# Patient Record
Sex: MALE | Race: WHITE | NOT HISPANIC OR LATINO | Employment: OTHER | URBAN - NONMETROPOLITAN AREA
[De-identification: names, ages, dates, MRNs, and addresses within clinical notes are randomized per-mention and may not be internally consistent; named-entity substitution may affect disease eponyms.]

---

## 2019-10-25 ENCOUNTER — HOSPITAL ENCOUNTER (EMERGENCY)
Facility: HOSPITAL | Age: 64
Discharge: HOME | End: 2019-10-25
Attending: EMERGENCY MEDICINE | Admitting: EMERGENCY MEDICINE

## 2019-10-25 ENCOUNTER — APPOINTMENT (OUTPATIENT)
Dept: ULTRASOUND IMAGING | Facility: HOSPITAL | Age: 64
End: 2019-10-25

## 2019-10-25 VITALS
TEMPERATURE: 97.7 F | OXYGEN SATURATION: 98 % | HEART RATE: 103 BPM | BODY MASS INDEX: 18.06 KG/M2 | HEIGHT: 66 IN | WEIGHT: 112.4 LBS | SYSTOLIC BLOOD PRESSURE: 161 MMHG | DIASTOLIC BLOOD PRESSURE: 106 MMHG | RESPIRATION RATE: 18 BRPM

## 2019-10-25 DIAGNOSIS — S40.021A TRAUMATIC HEMATOMA OF RIGHT UPPER ARM, INITIAL ENCOUNTER: Primary | ICD-10-CM

## 2019-10-25 PROCEDURE — 93971 EXTREMITY STUDY: CPT

## 2019-10-25 PROCEDURE — 99282 EMERGENCY DEPT VISIT SF MDM: CPT

## 2019-12-30 ENCOUNTER — HOSPITAL ENCOUNTER (EMERGENCY)
Facility: HOSPITAL | Age: 64
Discharge: HOME | End: 2019-12-30
Attending: EMERGENCY MEDICINE | Admitting: EMERGENCY MEDICINE

## 2019-12-30 ENCOUNTER — APPOINTMENT (OUTPATIENT)
Dept: GENERAL RADIOLOGY | Facility: HOSPITAL | Age: 64
End: 2019-12-30

## 2019-12-30 ENCOUNTER — APPOINTMENT (OUTPATIENT)
Dept: CT IMAGING | Facility: HOSPITAL | Age: 64
End: 2019-12-30

## 2019-12-30 ENCOUNTER — APPOINTMENT (OUTPATIENT)
Dept: ULTRASOUND IMAGING | Facility: HOSPITAL | Age: 64
End: 2019-12-30

## 2019-12-30 VITALS
SYSTOLIC BLOOD PRESSURE: 119 MMHG | HEIGHT: 66 IN | OXYGEN SATURATION: 94 % | BODY MASS INDEX: 18.61 KG/M2 | TEMPERATURE: 97.6 F | RESPIRATION RATE: 16 BRPM | WEIGHT: 115.8 LBS | DIASTOLIC BLOOD PRESSURE: 83 MMHG | HEART RATE: 64 BPM

## 2019-12-30 DIAGNOSIS — R10.9 ABDOMINAL PAIN, UNSPECIFIED ABDOMINAL LOCATION: ICD-10-CM

## 2019-12-30 DIAGNOSIS — N30.00 ACUTE CYSTITIS WITHOUT HEMATURIA: ICD-10-CM

## 2019-12-30 DIAGNOSIS — K85.20 ALCOHOL-INDUCED ACUTE PANCREATITIS WITHOUT INFECTION OR NECROSIS: Primary | ICD-10-CM

## 2019-12-30 LAB
ALBUMIN SERPL-MCNC: 3.7 G/DL (ref 3.5–5.2)
ALBUMIN/GLOB SERPL: 1.1 G/DL
ALP SERPL-CCNC: 103 U/L (ref 39–117)
ALT SERPL W P-5'-P-CCNC: 42 U/L (ref 1–41)
ANION GAP SERPL CALCULATED.3IONS-SCNC: 14 MMOL/L (ref 5–15)
AST SERPL-CCNC: 52 U/L (ref 1–40)
BACTERIA UR QL AUTO: ABNORMAL /HPF
BASOPHILS # BLD AUTO: 0.07 10*3/MM3 (ref 0–0.2)
BASOPHILS NFR BLD AUTO: 0.7 % (ref 0–1.5)
BILIRUB SERPL-MCNC: 0.8 MG/DL (ref 0.2–1.2)
BILIRUB UR QL STRIP: ABNORMAL
BUN BLD-MCNC: 8 MG/DL (ref 8–23)
BUN/CREAT SERPL: 10.3 (ref 7–25)
CALCIUM SPEC-SCNC: 9.3 MG/DL (ref 8.6–10.5)
CHLORIDE SERPL-SCNC: 100 MMOL/L (ref 98–107)
CLARITY UR: CLEAR
CO2 SERPL-SCNC: 23 MMOL/L (ref 22–29)
COLOR UR: ABNORMAL
CREAT BLD-MCNC: 0.78 MG/DL (ref 0.76–1.27)
DEPRECATED RDW RBC AUTO: 46.5 FL (ref 37–54)
EOSINOPHIL # BLD AUTO: 0.61 10*3/MM3 (ref 0–0.4)
EOSINOPHIL NFR BLD AUTO: 6.3 % (ref 0.3–6.2)
ERYTHROCYTE [DISTWIDTH] IN BLOOD BY AUTOMATED COUNT: 12.3 % (ref 12.3–15.4)
GFR SERPL CREATININE-BSD FRML MDRD: 101 ML/MIN/1.73
GLOBULIN UR ELPH-MCNC: 3.3 GM/DL
GLUCOSE BLD-MCNC: 200 MG/DL (ref 65–99)
GLUCOSE UR STRIP-MCNC: NEGATIVE MG/DL
HCT VFR BLD AUTO: 41.7 % (ref 37.5–51)
HGB BLD-MCNC: 14.1 G/DL (ref 13–17.7)
HGB UR QL STRIP.AUTO: NEGATIVE
HOLD SPECIMEN: NORMAL
HOLD SPECIMEN: NORMAL
HYALINE CASTS UR QL AUTO: ABNORMAL /LPF
IMM GRANULOCYTES # BLD AUTO: 0.02 10*3/MM3 (ref 0–0.05)
IMM GRANULOCYTES NFR BLD AUTO: 0.2 % (ref 0–0.5)
KETONES UR QL STRIP: ABNORMAL
LEUKOCYTE ESTERASE UR QL STRIP.AUTO: ABNORMAL
LIPASE SERPL-CCNC: 372 U/L (ref 13–60)
LYMPHOCYTES # BLD AUTO: 1.64 10*3/MM3 (ref 0.7–3.1)
LYMPHOCYTES NFR BLD AUTO: 17 % (ref 19.6–45.3)
MCH RBC QN AUTO: 34.6 PG (ref 26.6–33)
MCHC RBC AUTO-ENTMCNC: 33.8 G/DL (ref 31.5–35.7)
MCV RBC AUTO: 102.2 FL (ref 79–97)
MONOCYTES # BLD AUTO: 1.15 10*3/MM3 (ref 0.1–0.9)
MONOCYTES NFR BLD AUTO: 11.9 % (ref 5–12)
MUCOUS THREADS URNS QL MICRO: ABNORMAL /HPF
NEUTROPHILS # BLD AUTO: 6.17 10*3/MM3 (ref 1.7–7)
NEUTROPHILS NFR BLD AUTO: 63.9 % (ref 42.7–76)
NITRITE UR QL STRIP: NEGATIVE
NRBC BLD AUTO-RTO: 0 /100 WBC (ref 0–0.2)
PH UR STRIP.AUTO: 6.5 [PH] (ref 5–8)
PLATELET # BLD AUTO: 339 10*3/MM3 (ref 140–450)
PMV BLD AUTO: 8.3 FL (ref 6–12)
POTASSIUM BLD-SCNC: 4.2 MMOL/L (ref 3.5–5.2)
PROT SERPL-MCNC: 7 G/DL (ref 6–8.5)
PROT UR QL STRIP: ABNORMAL
RBC # BLD AUTO: 4.08 10*6/MM3 (ref 4.14–5.8)
RBC # UR: ABNORMAL /HPF
REF LAB TEST METHOD: ABNORMAL
SODIUM BLD-SCNC: 137 MMOL/L (ref 136–145)
SP GR UR STRIP: >=1.03 (ref 1–1.03)
SQUAMOUS #/AREA URNS HPF: ABNORMAL /HPF
TROPONIN T SERPL-MCNC: <0.01 NG/ML (ref 0–0.03)
UROBILINOGEN UR QL STRIP: ABNORMAL
WBC NRBC COR # BLD: 9.66 10*3/MM3 (ref 3.4–10.8)
WBC UR QL AUTO: ABNORMAL /HPF
WHOLE BLOOD HOLD SPECIMEN: NORMAL
WHOLE BLOOD HOLD SPECIMEN: NORMAL

## 2019-12-30 PROCEDURE — 81001 URINALYSIS AUTO W/SCOPE: CPT | Performed by: PHYSICIAN ASSISTANT

## 2019-12-30 PROCEDURE — 83690 ASSAY OF LIPASE: CPT | Performed by: EMERGENCY MEDICINE

## 2019-12-30 PROCEDURE — 99284 EMERGENCY DEPT VISIT MOD MDM: CPT

## 2019-12-30 PROCEDURE — 93005 ELECTROCARDIOGRAM TRACING: CPT | Performed by: EMERGENCY MEDICINE

## 2019-12-30 PROCEDURE — 84484 ASSAY OF TROPONIN QUANT: CPT | Performed by: EMERGENCY MEDICINE

## 2019-12-30 PROCEDURE — 71046 X-RAY EXAM CHEST 2 VIEWS: CPT

## 2019-12-30 PROCEDURE — 25010000002 IOPAMIDOL 61 % SOLUTION: Performed by: EMERGENCY MEDICINE

## 2019-12-30 PROCEDURE — 76705 ECHO EXAM OF ABDOMEN: CPT

## 2019-12-30 PROCEDURE — 80053 COMPREHEN METABOLIC PANEL: CPT | Performed by: EMERGENCY MEDICINE

## 2019-12-30 PROCEDURE — 85025 COMPLETE CBC W/AUTO DIFF WBC: CPT | Performed by: EMERGENCY MEDICINE

## 2019-12-30 PROCEDURE — 96360 HYDRATION IV INFUSION INIT: CPT

## 2019-12-30 PROCEDURE — 74177 CT ABD & PELVIS W/CONTRAST: CPT

## 2019-12-30 RX ORDER — ONDANSETRON 4 MG/1
4 TABLET, FILM COATED ORAL EVERY 6 HOURS PRN
Qty: 20 TABLET | Refills: 0 | Status: ON HOLD | OUTPATIENT
Start: 2019-12-30 | End: 2020-01-10

## 2019-12-30 RX ORDER — OXYCODONE HYDROCHLORIDE AND ACETAMINOPHEN 5; 325 MG/1; MG/1
1 TABLET ORAL EVERY 6 HOURS PRN
Qty: 20 TABLET | Refills: 0 | Status: ON HOLD | OUTPATIENT
Start: 2019-12-30 | End: 2020-01-10

## 2019-12-30 RX ORDER — CEFUROXIME AXETIL 500 MG/1
500 TABLET ORAL 2 TIMES DAILY
Qty: 14 TABLET | Refills: 0 | Status: SHIPPED | OUTPATIENT
Start: 2019-12-30 | End: 2020-01-06

## 2019-12-30 RX ORDER — SODIUM CHLORIDE 0.9 % (FLUSH) 0.9 %
10 SYRINGE (ML) INJECTION AS NEEDED
Status: DISCONTINUED | OUTPATIENT
Start: 2019-12-30 | End: 2019-12-30 | Stop reason: HOSPADM

## 2019-12-30 RX ADMIN — SODIUM CHLORIDE 1000 ML: 9 INJECTION, SOLUTION INTRAVENOUS at 10:20

## 2019-12-30 RX ADMIN — IOPAMIDOL 100 ML: 612 INJECTION, SOLUTION INTRAVENOUS at 10:52

## 2020-01-09 ENCOUNTER — APPOINTMENT (OUTPATIENT)
Dept: CT IMAGING | Facility: HOSPITAL | Age: 65
End: 2020-01-09

## 2020-01-09 ENCOUNTER — HOSPITAL ENCOUNTER (INPATIENT)
Facility: HOSPITAL | Age: 65
LOS: 1 days | Discharge: ANOTHER HOSPITAL | End: 2020-01-10
Attending: EMERGENCY MEDICINE | Admitting: INTERNAL MEDICINE

## 2020-01-09 DIAGNOSIS — K86.1 ACUTE ON CHRONIC PANCREATITIS (HCC): Primary | ICD-10-CM

## 2020-01-09 DIAGNOSIS — K86.3 PSEUDOCYST OF PANCREAS: ICD-10-CM

## 2020-01-09 DIAGNOSIS — K85.90 ACUTE ON CHRONIC PANCREATITIS (HCC): Primary | ICD-10-CM

## 2020-01-09 LAB
ALBUMIN SERPL-MCNC: 2.9 G/DL (ref 3.5–5.2)
ALBUMIN/GLOB SERPL: 0.9 G/DL
ALP SERPL-CCNC: 124 U/L (ref 39–117)
ALT SERPL W P-5'-P-CCNC: 47 U/L (ref 1–41)
AMPHET+METHAMPHET UR QL: NEGATIVE
AMPHETAMINES UR QL: NEGATIVE
ANION GAP SERPL CALCULATED.3IONS-SCNC: 14.7 MMOL/L (ref 5–15)
AST SERPL-CCNC: 33 U/L (ref 1–40)
BACTERIA UR QL AUTO: ABNORMAL /HPF
BARBITURATES UR QL SCN: NEGATIVE
BASOPHILS # BLD AUTO: 0.04 10*3/MM3 (ref 0–0.2)
BASOPHILS NFR BLD AUTO: 0.3 % (ref 0–1.5)
BENZODIAZ UR QL SCN: NEGATIVE
BILIRUB SERPL-MCNC: 1.6 MG/DL (ref 0.2–1.2)
BILIRUB UR QL STRIP: NEGATIVE
BUN BLD-MCNC: 8 MG/DL (ref 8–23)
BUN/CREAT SERPL: 11.4 (ref 7–25)
BUPRENORPHINE SERPL-MCNC: NEGATIVE NG/ML
CALCIUM SPEC-SCNC: 8.5 MG/DL (ref 8.6–10.5)
CANNABINOIDS SERPL QL: NEGATIVE
CHLORIDE SERPL-SCNC: 98 MMOL/L (ref 98–107)
CLARITY UR: CLEAR
CO2 SERPL-SCNC: 24.3 MMOL/L (ref 22–29)
COCAINE UR QL: NEGATIVE
COLOR UR: YELLOW
CREAT BLD-MCNC: 0.7 MG/DL (ref 0.76–1.27)
DEPRECATED RDW RBC AUTO: 49.1 FL (ref 37–54)
EOSINOPHIL # BLD AUTO: 0.14 10*3/MM3 (ref 0–0.4)
EOSINOPHIL NFR BLD AUTO: 1 % (ref 0.3–6.2)
ERYTHROCYTE [DISTWIDTH] IN BLOOD BY AUTOMATED COUNT: 13.2 % (ref 12.3–15.4)
ETHANOL BLD-MCNC: <10 MG/DL (ref 0–10)
ETHANOL UR QL: <0.01 %
GFR SERPL CREATININE-BSD FRML MDRD: 114 ML/MIN/1.73
GLOBULIN UR ELPH-MCNC: 3.3 GM/DL
GLUCOSE BLD-MCNC: 116 MG/DL (ref 65–99)
GLUCOSE BLDC GLUCOMTR-MCNC: 118 MG/DL (ref 70–130)
GLUCOSE UR STRIP-MCNC: NEGATIVE MG/DL
HCT VFR BLD AUTO: 35.1 % (ref 37.5–51)
HGB BLD-MCNC: 11.6 G/DL (ref 13–17.7)
HGB UR QL STRIP.AUTO: NEGATIVE
HOLD SPECIMEN: NORMAL
HOLD SPECIMEN: NORMAL
HYALINE CASTS UR QL AUTO: ABNORMAL /LPF
IMM GRANULOCYTES # BLD AUTO: 0.09 10*3/MM3 (ref 0–0.05)
IMM GRANULOCYTES NFR BLD AUTO: 0.7 % (ref 0–0.5)
KETONES UR QL STRIP: NEGATIVE
LEUKOCYTE ESTERASE UR QL STRIP.AUTO: ABNORMAL
LIPASE SERPL-CCNC: 861 U/L (ref 13–60)
LYMPHOCYTES # BLD AUTO: 1.54 10*3/MM3 (ref 0.7–3.1)
LYMPHOCYTES NFR BLD AUTO: 11.4 % (ref 19.6–45.3)
MCH RBC QN AUTO: 33.2 PG (ref 26.6–33)
MCHC RBC AUTO-ENTMCNC: 33 G/DL (ref 31.5–35.7)
MCV RBC AUTO: 100.6 FL (ref 79–97)
METHADONE UR QL SCN: NEGATIVE
MONOCYTES # BLD AUTO: 0.42 10*3/MM3 (ref 0.1–0.9)
MONOCYTES NFR BLD AUTO: 3.1 % (ref 5–12)
NEUTROPHILS # BLD AUTO: 11.28 10*3/MM3 (ref 1.7–7)
NEUTROPHILS NFR BLD AUTO: 83.5 % (ref 42.7–76)
NITRITE UR QL STRIP: NEGATIVE
NRBC BLD AUTO-RTO: 0 /100 WBC (ref 0–0.2)
OPIATES UR QL: NEGATIVE
OXYCODONE UR QL SCN: NEGATIVE
PCP UR QL SCN: NEGATIVE
PH UR STRIP.AUTO: 5.5 [PH] (ref 5–8)
PLATELET # BLD AUTO: 356 10*3/MM3 (ref 140–450)
PMV BLD AUTO: 9.7 FL (ref 6–12)
POTASSIUM BLD-SCNC: 3.5 MMOL/L (ref 3.5–5.2)
PROPOXYPH UR QL: NEGATIVE
PROT SERPL-MCNC: 6.2 G/DL (ref 6–8.5)
PROT UR QL STRIP: NEGATIVE
RBC # BLD AUTO: 3.49 10*6/MM3 (ref 4.14–5.8)
RBC # UR: ABNORMAL /HPF
REF LAB TEST METHOD: ABNORMAL
SODIUM BLD-SCNC: 137 MMOL/L (ref 136–145)
SP GR UR STRIP: 1.01 (ref 1–1.03)
SQUAMOUS #/AREA URNS HPF: ABNORMAL /HPF
TRICYCLICS UR QL SCN: NEGATIVE
UROBILINOGEN UR QL STRIP: ABNORMAL
WBC NRBC COR # BLD: 13.51 10*3/MM3 (ref 3.4–10.8)
WBC UR QL AUTO: ABNORMAL /HPF
WHOLE BLOOD HOLD SPECIMEN: NORMAL
WHOLE BLOOD HOLD SPECIMEN: NORMAL

## 2020-01-09 PROCEDURE — 25010000002 IOPAMIDOL 61 % SOLUTION: Performed by: EMERGENCY MEDICINE

## 2020-01-09 PROCEDURE — 25010000002 MORPHINE PER 10 MG: Performed by: EMERGENCY MEDICINE

## 2020-01-09 PROCEDURE — 80053 COMPREHEN METABOLIC PANEL: CPT | Performed by: EMERGENCY MEDICINE

## 2020-01-09 PROCEDURE — 81001 URINALYSIS AUTO W/SCOPE: CPT

## 2020-01-09 PROCEDURE — 74177 CT ABD & PELVIS W/CONTRAST: CPT

## 2020-01-09 PROCEDURE — 99222 1ST HOSP IP/OBS MODERATE 55: CPT | Performed by: INTERNAL MEDICINE

## 2020-01-09 PROCEDURE — 82962 GLUCOSE BLOOD TEST: CPT

## 2020-01-09 PROCEDURE — 80307 DRUG TEST PRSMV CHEM ANLYZR: CPT | Performed by: EMERGENCY MEDICINE

## 2020-01-09 PROCEDURE — 85025 COMPLETE CBC W/AUTO DIFF WBC: CPT | Performed by: EMERGENCY MEDICINE

## 2020-01-09 PROCEDURE — 25010000002 PIPERACILLIN SOD-TAZOBACTAM PER 1 G: Performed by: EMERGENCY MEDICINE

## 2020-01-09 PROCEDURE — 25010000002 ONDANSETRON PER 1 MG: Performed by: EMERGENCY MEDICINE

## 2020-01-09 PROCEDURE — 83690 ASSAY OF LIPASE: CPT | Performed by: EMERGENCY MEDICINE

## 2020-01-09 PROCEDURE — 25010000002 MORPHINE PER 10 MG: Performed by: INTERNAL MEDICINE

## 2020-01-09 PROCEDURE — 99284 EMERGENCY DEPT VISIT MOD MDM: CPT

## 2020-01-09 RX ORDER — SODIUM CHLORIDE 0.9 % (FLUSH) 0.9 %
10 SYRINGE (ML) INJECTION EVERY 12 HOURS SCHEDULED
Status: DISCONTINUED | OUTPATIENT
Start: 2020-01-09 | End: 2020-01-10

## 2020-01-09 RX ORDER — LORAZEPAM 2 MG/ML
2 INJECTION INTRAMUSCULAR
Status: DISCONTINUED | OUTPATIENT
Start: 2020-01-09 | End: 2020-01-10

## 2020-01-09 RX ORDER — ACETAMINOPHEN 160 MG/5ML
650 SOLUTION ORAL EVERY 4 HOURS PRN
Status: DISCONTINUED | OUTPATIENT
Start: 2020-01-09 | End: 2020-01-10

## 2020-01-09 RX ORDER — MORPHINE SULFATE 2 MG/ML
2 INJECTION, SOLUTION INTRAMUSCULAR; INTRAVENOUS EVERY 4 HOURS PRN
Status: DISCONTINUED | OUTPATIENT
Start: 2020-01-09 | End: 2020-01-10

## 2020-01-09 RX ORDER — SODIUM CHLORIDE 0.9 % (FLUSH) 0.9 %
10 SYRINGE (ML) INJECTION AS NEEDED
Status: DISCONTINUED | OUTPATIENT
Start: 2020-01-09 | End: 2020-01-10

## 2020-01-09 RX ORDER — LORAZEPAM 0.5 MG/1
1 TABLET ORAL
Status: DISCONTINUED | OUTPATIENT
Start: 2020-01-09 | End: 2020-01-10

## 2020-01-09 RX ORDER — ACETAMINOPHEN 325 MG/1
650 TABLET ORAL EVERY 4 HOURS PRN
Status: DISCONTINUED | OUTPATIENT
Start: 2020-01-09 | End: 2020-01-10

## 2020-01-09 RX ORDER — ONDANSETRON 2 MG/ML
4 INJECTION INTRAMUSCULAR; INTRAVENOUS ONCE
Status: COMPLETED | OUTPATIENT
Start: 2020-01-09 | End: 2020-01-09

## 2020-01-09 RX ORDER — MORPHINE SULFATE 4 MG/ML
4 INJECTION, SOLUTION INTRAMUSCULAR; INTRAVENOUS ONCE
Status: DISCONTINUED | OUTPATIENT
Start: 2020-01-09 | End: 2020-01-09

## 2020-01-09 RX ORDER — NALOXONE HCL 0.4 MG/ML
0.4 VIAL (ML) INJECTION
Status: DISCONTINUED | OUTPATIENT
Start: 2020-01-09 | End: 2020-01-10

## 2020-01-09 RX ORDER — LORAZEPAM 2 MG/ML
1 INJECTION INTRAMUSCULAR
Status: DISCONTINUED | OUTPATIENT
Start: 2020-01-09 | End: 2020-01-10

## 2020-01-09 RX ORDER — LORAZEPAM 0.5 MG/1
2 TABLET ORAL
Status: DISCONTINUED | OUTPATIENT
Start: 2020-01-09 | End: 2020-01-10

## 2020-01-09 RX ORDER — BISACODYL 10 MG
10 SUPPOSITORY, RECTAL RECTAL DAILY PRN
Status: DISCONTINUED | OUTPATIENT
Start: 2020-01-09 | End: 2020-01-10

## 2020-01-09 RX ORDER — SODIUM CHLORIDE, SODIUM LACTATE, POTASSIUM CHLORIDE, CALCIUM CHLORIDE 600; 310; 30; 20 MG/100ML; MG/100ML; MG/100ML; MG/100ML
150 INJECTION, SOLUTION INTRAVENOUS CONTINUOUS
Status: DISCONTINUED | OUTPATIENT
Start: 2020-01-09 | End: 2020-01-10

## 2020-01-09 RX ORDER — ACETAMINOPHEN 650 MG/1
650 SUPPOSITORY RECTAL EVERY 4 HOURS PRN
Status: DISCONTINUED | OUTPATIENT
Start: 2020-01-09 | End: 2020-01-10

## 2020-01-09 RX ORDER — ONDANSETRON 2 MG/ML
4 INJECTION INTRAMUSCULAR; INTRAVENOUS EVERY 6 HOURS PRN
Status: DISCONTINUED | OUTPATIENT
Start: 2020-01-09 | End: 2020-01-10

## 2020-01-09 RX ORDER — NICOTINE 21 MG/24HR
1 PATCH, TRANSDERMAL 24 HOURS TRANSDERMAL
Status: DISCONTINUED | OUTPATIENT
Start: 2020-01-09 | End: 2020-01-10

## 2020-01-09 RX ADMIN — SODIUM CHLORIDE, POTASSIUM CHLORIDE, SODIUM LACTATE AND CALCIUM CHLORIDE 150 ML/HR: 600; 310; 30; 20 INJECTION, SOLUTION INTRAVENOUS at 12:41

## 2020-01-09 RX ADMIN — PIPERACILLIN SODIUM AND TAZOBACTAM SODIUM 3.38 G: 3; .375 INJECTION, POWDER, FOR SOLUTION INTRAVENOUS at 12:41

## 2020-01-09 RX ADMIN — LORAZEPAM 1 MG: 0.5 TABLET ORAL at 22:44

## 2020-01-09 RX ADMIN — IOPAMIDOL 100 ML: 612 INJECTION, SOLUTION INTRAVENOUS at 10:32

## 2020-01-09 RX ADMIN — ACETAMINOPHEN 650 MG: 325 TABLET, FILM COATED ORAL at 23:47

## 2020-01-09 RX ADMIN — MORPHINE SULFATE 2 MG: 2 INJECTION, SOLUTION INTRAMUSCULAR; INTRAVENOUS at 18:19

## 2020-01-09 RX ADMIN — SODIUM CHLORIDE 1000 ML: 9 INJECTION, SOLUTION INTRAVENOUS at 10:17

## 2020-01-09 RX ADMIN — ONDANSETRON 4 MG: 2 INJECTION INTRAMUSCULAR; INTRAVENOUS at 10:18

## 2020-01-09 RX ADMIN — NICOTINE 1 PATCH: 21 PATCH TRANSDERMAL at 18:19

## 2020-01-09 RX ADMIN — SODIUM CHLORIDE, POTASSIUM CHLORIDE, SODIUM LACTATE AND CALCIUM CHLORIDE 150 ML/HR: 600; 310; 30; 20 INJECTION, SOLUTION INTRAVENOUS at 23:13

## 2020-01-09 NOTE — ED PROVIDER NOTES
Subjective   64-year-old male presenting with abdominal pain.  He states initially that for 10 days he has had abdominal pain, he then tells me that for a month has had abdominal pain.  He points all over his abdomen and describes a severe, sharp pain.  This is made worse when he eats.  No alleviating factors.  Is associated with nausea and vomiting.  No fevers, diarrhea or other complaints.  He is a former heavy drinker. He tells me he has had about one third of the beer.  He was here recently, about 10 days ago, was diagnosed with pancreatitis and went home with pain medication.  He states he is out of all the medications he was given.          Review of Systems   Constitutional: Negative.    HENT: Negative.    Eyes: Negative.    Respiratory: Negative.    Cardiovascular: Negative.    Gastrointestinal: Positive for abdominal pain, nausea and vomiting. Negative for diarrhea.   Genitourinary: Negative.    Musculoskeletal: Negative.    Skin: Negative.    Neurological: Negative.    Psychiatric/Behavioral: Negative.        Past Medical History:   Diagnosis Date   • Bleeding nose    • Hypertension        No Known Allergies    Past Surgical History:   Procedure Laterality Date   • ABDOMINAL SURGERY         History reviewed. No pertinent family history.    Social History     Socioeconomic History   • Marital status: Single     Spouse name: Not on file   • Number of children: Not on file   • Years of education: Not on file   • Highest education level: Not on file   Tobacco Use   • Smoking status: Current Every Day Smoker     Packs/day: 1.00     Types: Cigarettes   • Smokeless tobacco: Never Used   Substance and Sexual Activity   • Alcohol use: Yes     Comment: 1-2 daily   • Drug use: No   • Sexual activity: Defer           Objective   Physical Exam   Constitutional: He is oriented to person, place, and time. No distress.   Elderly, frail   HENT:   Head: Normocephalic and atraumatic.   Right Ear: External ear normal.   Left  Ear: External ear normal.   Nose: Nose normal.   Mouth/Throat: Oropharynx is clear and moist.   Eyes: Pupils are equal, round, and reactive to light. Conjunctivae and EOM are normal.   Neck: Normal range of motion. Neck supple.   Cardiovascular: Regular rhythm, normal heart sounds and intact distal pulses.   Tachycardic   Pulmonary/Chest: Effort normal and breath sounds normal. No respiratory distress.   Abdominal: Soft. Bowel sounds are normal. He exhibits no distension. There is no rebound and no guarding.   Diffuse tenderness to minimal palpation   Musculoskeletal: Normal range of motion. He exhibits no edema, tenderness or deformity.   Neurological: He is alert and oriented to person, place, and time.   Skin: Skin is warm and dry. No rash noted.   Psychiatric: He has a normal mood and affect. His behavior is normal.   Nursing note and vitals reviewed.      Procedures           ED Course                                               MDM  Number of Diagnoses or Management Options  Acute on chronic pancreatitis (CMS/HCC):   Pseudocyst of pancreas:   Diagnosis management comments: 64-year-old male with abdominal pain and nausea/vomiting.  Elderly frail-appearing man with exam as above.  He is notably tachycardic and has diffuse abdominal tenderness.  Will obtain labs, CT scan.  We will give IV fluids and symptomatic treatment.  Disposition pending work-up.    DDX: Pancreatitis, gastritis, dehydration, electrolyte abnormality, intoxication    12:21 PM Work-up thus far notable for leukocytosis, mildly elevated LFTs and bilirubin.  CT scan shows improvement of his previously noted inflammation around the pancreas, he does have a large what appears to be pseudocyst or abscess near the right posterior lobe of the liver.  Also noted was partial occlusion of the portal vein.  Discussed the case at length with Dr. Mathur, who consulted with Dr. Blanton, they feel this is likely a large pseudocyst associated with the  pancreas.  Dr. Mathur feels patient needs higher level of care for likely procedure, will discuss with  for further recommendations.    12:38 PM Discussed the case with Dr. Robbins,  surgery and Dr. Moore,  [facility], they do feel patient needs to be at  for likely procedure.  Unfortunately there are no beds currently.  Will admit to our hospital and await transfer.  Patient will be placed as prior already on the wait list.  I also discussed the case with Dr. Bose, he recommended no anticoagulation for the thrombus as this may cause hemorrhagic pancreatitis.  I discussed case Dr. Perdomo who graciously accepted for admission.  Patient updated on plan of care.       Amount and/or Complexity of Data Reviewed  Decide to obtain previous medical records or to obtain history from someone other than the patient: yes        Final diagnoses:   Acute on chronic pancreatitis (CMS/HCC)   Pseudocyst of pancreas            Huey Escobar MD  01/09/20 6035

## 2020-01-09 NOTE — H&P
"    [facility]      Name:  Ambrosio Bang   Age:  64 y.o.  Sex:  male  :  1955  MRN:  [id number]   Visit Number:  [id number]  Admission Date:  2020  Date Of Service:  20  Primary Care Physician:  Provider, No Known    Chief Complaint:     Intractable nausea vomiting    History Of Presenting Illness:      Patient is a 64-year-old male with history significant for chronic pain on opiates who presents to the emergency room tonight with complaints of intractable nausea and vomiting.  Patient has multiple ER admissions for the same complaint.  Patient was last seen in the emergency room on 2019 and diagnosed with pancreatitis.  He was discharged with instructions to stay on a clear liquid diet and to slowly advance as tolerable.  Patient is an extremely poor historian and it is difficult to obtain accurate history.  No family is present.  He cannot tell me when his last drink of alcohol was.  But states it was \"a while ago.\"  Patient mostly is just begging to eat.  I do spent a long time discussing with patient the risk of feeding and told him that he needed to be kept n.p.o.  Laboratory data was remarkable for significantly elevated lipase.  Work-up in the emergency room included a CT abdomen pelvis that showed noted inflammation around the pancreas with what appears to be a pseudocyst/abscess near the right posterior lobe of the liver.  Also noted on imaging was partial occlusion of the portal vein.   was notified who recommended a higher level of care and  was contacted for transfer.  Per ER documentation,case with Dr. Robbins,  surgery and Dr. Moore,  [facility], they do feel patient needs to be at  for likely procedure.  Unfortunately there are no beds currently.    Hospitalist service was contacted for admission with transfer once bed becomes available.  Furthermore, Dr. Bose was contacted and recommended no anticoagulation at this " time as this may cause hemorrhagic pancreatitis.  Currently patient is resting comfortably in bed.  He denies pain.  He states that he is hungry.     Review Of Systems:     General ROS: Patient denies any fevers, chills or loss of consciousness. Complains of generalized pain.  Psychological ROS: No history of any hallucinations and delusions.  Ophthalmic ROS: No history of any diplopia or transient loss of vision.  ENT ROS: No history of sore throat, nasal congestion or ear pain.   Allergy and Immunology ROS: No history of rash or itching.  Hematological and Lymphatic ROS: No history of neck swelling or easy bleeding.  Endocrine ROS: No history of any recent unintentional weight gain or loss.  Respiratory ROS: No history of cough or shortness of breath.   Cardiovascular ROS: No history of chest pain or palpitations.   Gastrointestinal ROS: Complains of nausea and vomiting.  Diffuse abdominal pain.  No diarrhea.  Genito-Urinary ROS: No history of dysuria or hematuria.  Musculoskeletal ROS: No muscle pain. No calf pain. Complains of chronic back pain.   Neurological ROS: No history of any focal weakness. No loss of consciousness.   Dermatological ROS: No history of any redness or pruritis.     Past Medical History:    Past Medical History:   Diagnosis Date   • Bleeding nose    • Hypertension        Past Surgical history:    Past Surgical History:   Procedure Laterality Date   • ABDOMINAL SURGERY         Social History:    Social History     Socioeconomic History   • Marital status: Single     Spouse name: Not on file   • Number of children: Not on file   • Years of education: Not on file   • Highest education level: Not on file   Tobacco Use   • Smoking status: Current Every Day Smoker     Packs/day: 1.00     Types: Cigarettes   • Smokeless tobacco: Never Used   Substance and Sexual Activity   • Alcohol use: Yes     Comment: 1-2 daily   • Drug use: No   • Sexual activity: Defer       Family History:    History  reviewed. No pertinent family history.    Allergies:      Patient has no known allergies.    Home Medications:    Prior to Admission Medications     Prescriptions Last Dose Informant Patient Reported? Taking?    ondansetron (ZOFRAN) 4 MG tablet Past Week  No Yes    Take 1 tablet by mouth Every 6 (Six) Hours As Needed for Nausea for up to 20 days.    oxyCODONE-acetaminophen (PERCOCET) 5-325 MG per tablet Past Week  No Yes    Take 1 tablet by mouth Every 6 (Six) Hours As Needed for Moderate Pain .             ED Medications:    Medications   sodium chloride 0.9 % flush 10 mL (has no administration in time range)   lactated ringers infusion (150 mL/hr Intravenous New Bag 1/9/20 1241)   Influenza Vac Subunit Quad (FLUCELVAX) injection 0.5 mL (has no administration in time range)   sodium chloride 0.9 % flush 10 mL (has no administration in time range)   sodium chloride 0.9 % flush 10 mL (has no administration in time range)   acetaminophen (TYLENOL) tablet 650 mg (has no administration in time range)     Or   acetaminophen (TYLENOL) 160 MG/5ML solution 650 mg (has no administration in time range)     Or   acetaminophen (TYLENOL) suppository 650 mg (has no administration in time range)   bisacodyl (DULCOLAX) suppository 10 mg (has no administration in time range)   ondansetron (ZOFRAN) injection 4 mg (has no administration in time range)   Morphine sulfate (PF) injection 2 mg (has no administration in time range)     And   naloxone (NARCAN) injection 0.4 mg (has no administration in time range)   sodium chloride 0.9 % bolus 1,000 mL (0 mL Intravenous Stopped 1/9/20 1208)   ondansetron (ZOFRAN) injection 4 mg (4 mg Intravenous Given 1/9/20 1018)   iopamidol (ISOVUE-300) 61 % injection 100 mL (100 mL Intravenous Given 1/9/20 1032)   piperacillin-tazobactam (ZOSYN) IVPB 3.375 g in 100 mL NS (0 g Intravenous Stopped 1/9/20 1320)       Vital Signs:    Temp:  [97.9 °F (36.6 °C)-98.5 °F (36.9 °C)] 98.5 °F (36.9 °C)  Heart Rate:   [] 70  Resp:  [18-20] 20  BP: ()/(53-66) 93/60        01/09/20  0952 01/09/20  1352   Weight: 49.9 kg (110 lb) 51.6 kg (113 lb 12.8 oz)       Body mass index is 18.37 kg/m².    Physical Exam:    General Appearance:  Alert and cooperative, not in any acute distress.   Head:  Atraumatic and normocephalic, without obvious abnormality.   Eyes:          PERRLA, conjunctivae and sclerae normal, no Icterus. No pallor. Extraocular movements are within normal limits.   Ears:  Ears appear intact with no abnormalities noted.   Throat: No oral lesions, no thrush,    Neck: Supple, trachea midline,        Lungs:   Chest shape is normal. Breath sounds heard bilaterally equally.  No crackles or wheezing.   Heart:  Normal S1 and S2, no murmur,   Abdomen:    Bowel sounds present, soft, nondistended, diffusely tender   Extremities: no edema, no cyanosis, no clubbing.   Pulses: Pulses palpable and equal bilaterally.   Skin: No bleeding, bruising or rash.   Neurologic: Alert and oriented x 3. Moves all four limbs equally. No tremors. No facial asymmetry.     Laboratory data:    I have reviewed the labs done in the emergency room.    Results from last 7 days   Lab Units 01/09/20  1024   SODIUM mmol/L 137   POTASSIUM mmol/L 3.5   CHLORIDE mmol/L 98   CO2 mmol/L 24.3   BUN mg/dL 8   CREATININE mg/dL 0.70*   CALCIUM mg/dL 8.5*   BILIRUBIN mg/dL 1.6*   ALK PHOS U/L 124*   ALT (SGPT) U/L 47*   AST (SGOT) U/L 33   GLUCOSE mg/dL 116*     Results from last 7 days   Lab Units 01/09/20  1000   WBC 10*3/mm3 13.51*   HEMOGLOBIN g/dL 11.6*   HEMATOCRIT % 35.1*   PLATELETS 10*3/mm3 356                     Results from last 7 days   Lab Units 01/09/20  1024   LIPASE U/L 861*         Results from last 7 days   Lab Units 01/09/20  1005   COLOR UA  Yellow   GLUCOSE UA  Negative   KETONES UA  Negative   LEUKOCYTES UA  Trace*   PH, URINE  5.5   BILIRUBIN UA  Negative   UROBILINOGEN UA  1.0 E.U./dL     Pain Management Panel     Pain Management  Panel Latest Ref Rng & Units 1/9/2020    AMPHETAMINES SCREEN, URINE Negative Negative    BARBITURATES SCREEN Negative Negative    BENZODIAZEPINE SCREEN, URINE Negative Negative    BUPRENORPHINEUR Negative Negative    COCAINE SCREEN, URINE Negative Negative    METHADONE SCREEN, URINE Negative Negative    METHAMPHETAMINEUR Negative Negative                      Radiology:    Imaging Results (Last 72 Hours)     Procedure Component Value Units Date/Time    CT Abdomen Pelvis With Contrast [[id number]] Collected:  01/09/20 1047     Updated:  01/09/20 1057    Narrative:       PROCEDURE: CT ABDOMEN PELVIS W CONTRAST-     HISTORY:  abd pain, n/v     COMPARISON: 12/30/2019.     TECHNIQUE: Multiple axial CT images were obtained from the lung bases  through the pubic symphysis following the administration of Isovue 300  contrast.      FINDINGS:      ABDOMEN: The lung bases are clear. The heart is normal in size. The  liver is diffusely hypodense consistent with fatty infiltration. There  is a transient hepatic attenuation difference involving the left lobe of  the liver and there is partial thrombosis of the portal vein best  appreciated on axial image 23 and coronal image 40 which is new compared  to the prior exam. There has been interval increase in size of the  subcapsular fluid collection involving the posterior right lobe of the  liver which measures 6.9 x 3.9 cm. The spleen is unremarkable. No  adrenal mass is present.  The pancreas is atrophic and diffusely  calcified consistent with chronic pancreatitis. The previously noted  inflammatory stranding has improved, however there is persistent  inflammatory stranding adjacent to the head of the pancreas and there is  a small amount of fluid in the right paracolic gutter. The kidneys are  normal. The aorta is normal in caliber. There is no free fluid or  adenopathy. The abdominal portions of the GI tract are unremarkable with  no evidence of obstruction.     PELVIS: The  appendix is not identified.  The urinary bladder is  unremarkable. There is no significant free fluid or adenopathy.       Impression:       1. Interval improvement in the patient's acute on chronic pancreatitis  with some persistent inflammatory stranding adjacent to the head of the  pancreas and a small amount of fluid in the right paracolic gutter.  2. Enlargement in the subcutaneous capsular fluid collection involving  the posterior right lobe of the liver which may reflect a pseudocyst or  abscess.  3. Partially occlusive thrombus within the portal vein.     409.36 mGy.cm        This study was performed with techniques to keep radiation doses as low  as reasonably achievable (ALARA). Individualized dose reduction  techniques using automated exposure control or adjustment of mA and/or  kV according to the patient size were employed.      This report was finalized on 1/9/2020 10:55 AM by Ragini Blanton M.D..            Acute on chronic pancreatitis (CMS/HCC)      Assessment:    Acute pancreatitis, likely secondary to alcohol use  Portal vein thrombosis  History of alcohol abuse    Plan:    We will admit patient to medical floor.  Have initiated treatment for acute pancreatitis with n.p.o. and symptomatic control.  Will give Zofran for nausea and morphine for pain as needed.  Additionally will hold off on anticoagulation due to risk of hemorrhagic pancreatitis.  Will monitor for alcohol withdrawal symptoms.  Patient is awaiting a bed at .  Once available we will transfer. Dr Castro is following.     Martín Perdomo DO  01/09/20  5:39 PM    Dictated utilizing Dragon dictation.

## 2020-01-09 NOTE — PLAN OF CARE
Problem: Patient Care Overview  Goal: Plan of Care Review  Outcome: Ongoing (interventions implemented as appropriate)  Flowsheets  Taken 1/9/2020 1858  Progress: no change  Taken 1/9/2020 1600  Plan of Care Reviewed With: patient

## 2020-01-09 NOTE — ED NOTES
Requested Med Surg bed from [facility], at 1238. Will call back with bed assignment.      Salma Noriega  01/09/20 0228

## 2020-01-09 NOTE — ED NOTES
Dr. Escobar notified of pts bp of 89/56, will hold Morphine order for now until fluid bolus has went in and bp has improved      Nancy Pastrana, RN  01/09/20 3285

## 2020-01-10 VITALS
HEIGHT: 66 IN | BODY MASS INDEX: 19.77 KG/M2 | WEIGHT: 123 LBS | OXYGEN SATURATION: 97 % | RESPIRATION RATE: 16 BRPM | TEMPERATURE: 98.7 F | SYSTOLIC BLOOD PRESSURE: 128 MMHG | HEART RATE: 78 BPM | DIASTOLIC BLOOD PRESSURE: 94 MMHG

## 2020-01-10 PROBLEM — K86.3 PSEUDOCYST OF PANCREAS: Status: ACTIVE | Noted: 2020-01-10

## 2020-01-10 LAB
ANION GAP SERPL CALCULATED.3IONS-SCNC: 11.8 MMOL/L (ref 5–15)
BASOPHILS # BLD AUTO: 0.05 10*3/MM3 (ref 0–0.2)
BASOPHILS NFR BLD AUTO: 0.4 % (ref 0–1.5)
BUN BLD-MCNC: 9 MG/DL (ref 8–23)
BUN/CREAT SERPL: 12 (ref 7–25)
CALCIUM SPEC-SCNC: 8.3 MG/DL (ref 8.6–10.5)
CHLORIDE SERPL-SCNC: 105 MMOL/L (ref 98–107)
CO2 SERPL-SCNC: 24.2 MMOL/L (ref 22–29)
CREAT BLD-MCNC: 0.75 MG/DL (ref 0.76–1.27)
DEPRECATED RDW RBC AUTO: 49.1 FL (ref 37–54)
EOSINOPHIL # BLD AUTO: 0.1 10*3/MM3 (ref 0–0.4)
EOSINOPHIL NFR BLD AUTO: 0.7 % (ref 0.3–6.2)
ERYTHROCYTE [DISTWIDTH] IN BLOOD BY AUTOMATED COUNT: 13.3 % (ref 12.3–15.4)
GFR SERPL CREATININE-BSD FRML MDRD: 106 ML/MIN/1.73
GLUCOSE BLD-MCNC: 121 MG/DL (ref 65–99)
HCT VFR BLD AUTO: 26.9 % (ref 37.5–51)
HGB BLD-MCNC: 8.9 G/DL (ref 13–17.7)
IMM GRANULOCYTES # BLD AUTO: 0.11 10*3/MM3 (ref 0–0.05)
IMM GRANULOCYTES NFR BLD AUTO: 0.8 % (ref 0–0.5)
LYMPHOCYTES # BLD AUTO: 1.66 10*3/MM3 (ref 0.7–3.1)
LYMPHOCYTES NFR BLD AUTO: 11.7 % (ref 19.6–45.3)
MCH RBC QN AUTO: 33.5 PG (ref 26.6–33)
MCHC RBC AUTO-ENTMCNC: 33.1 G/DL (ref 31.5–35.7)
MCV RBC AUTO: 101.1 FL (ref 79–97)
MONOCYTES # BLD AUTO: 1.01 10*3/MM3 (ref 0.1–0.9)
MONOCYTES NFR BLD AUTO: 7.1 % (ref 5–12)
NEUTROPHILS # BLD AUTO: 11.29 10*3/MM3 (ref 1.7–7)
NEUTROPHILS NFR BLD AUTO: 79.3 % (ref 42.7–76)
NRBC BLD AUTO-RTO: 0 /100 WBC (ref 0–0.2)
PLATELET # BLD AUTO: 260 10*3/MM3 (ref 140–450)
PMV BLD AUTO: 9 FL (ref 6–12)
POTASSIUM BLD-SCNC: 3.6 MMOL/L (ref 3.5–5.2)
RBC # BLD AUTO: 2.66 10*6/MM3 (ref 4.14–5.8)
SODIUM BLD-SCNC: 141 MMOL/L (ref 136–145)
WBC NRBC COR # BLD: 14.22 10*3/MM3 (ref 3.4–10.8)

## 2020-01-10 PROCEDURE — 99239 HOSP IP/OBS DSCHRG MGMT >30: CPT | Performed by: INTERNAL MEDICINE

## 2020-01-10 PROCEDURE — 85025 COMPLETE CBC W/AUTO DIFF WBC: CPT | Performed by: INTERNAL MEDICINE

## 2020-01-10 PROCEDURE — 99222 1ST HOSP IP/OBS MODERATE 55: CPT | Performed by: SURGERY

## 2020-01-10 PROCEDURE — 80048 BASIC METABOLIC PNL TOTAL CA: CPT | Performed by: INTERNAL MEDICINE

## 2020-01-10 RX ORDER — ACETAMINOPHEN 325 MG/1
650 TABLET ORAL EVERY 4 HOURS PRN
Start: 2020-01-10 | End: 2020-01-10 | Stop reason: HOSPADM

## 2020-01-10 RX ORDER — NALOXONE HCL 0.4 MG/ML
0.4 VIAL (ML) INJECTION
Start: 2020-01-10 | End: 2020-01-10 | Stop reason: HOSPADM

## 2020-01-10 RX ORDER — SODIUM CHLORIDE, SODIUM LACTATE, POTASSIUM CHLORIDE, CALCIUM CHLORIDE 600; 310; 30; 20 MG/100ML; MG/100ML; MG/100ML; MG/100ML
150 INJECTION, SOLUTION INTRAVENOUS CONTINUOUS
Start: 2020-01-10 | End: 2020-01-10 | Stop reason: HOSPADM

## 2020-01-10 RX ORDER — BENZONATATE 100 MG/1
100 CAPSULE ORAL 3 TIMES DAILY PRN
Start: 2020-01-10 | End: 2020-01-10 | Stop reason: HOSPADM

## 2020-01-10 RX ORDER — BISACODYL 10 MG
10 SUPPOSITORY, RECTAL RECTAL DAILY PRN
Start: 2020-01-10 | End: 2020-01-10 | Stop reason: HOSPADM

## 2020-01-10 RX ORDER — BENZONATATE 100 MG/1
100 CAPSULE ORAL 3 TIMES DAILY PRN
Status: DISCONTINUED | OUTPATIENT
Start: 2020-01-10 | End: 2020-01-10

## 2020-01-10 RX ORDER — NICOTINE 21 MG/24HR
1 PATCH, TRANSDERMAL 24 HOURS TRANSDERMAL
Start: 2020-01-11 | End: 2020-01-10 | Stop reason: HOSPADM

## 2020-01-10 RX ORDER — ONDANSETRON 2 MG/ML
4 INJECTION INTRAMUSCULAR; INTRAVENOUS EVERY 6 HOURS PRN
Start: 2020-01-10 | End: 2020-01-10 | Stop reason: HOSPADM

## 2020-01-10 RX ADMIN — SODIUM CHLORIDE, POTASSIUM CHLORIDE, SODIUM LACTATE AND CALCIUM CHLORIDE 150 ML/HR: 600; 310; 30; 20 INJECTION, SOLUTION INTRAVENOUS at 11:25

## 2020-01-10 RX ADMIN — NICOTINE 1 PATCH: 21 PATCH TRANSDERMAL at 17:47

## 2020-01-10 RX ADMIN — ACETAMINOPHEN 650 MG: 325 TABLET, FILM COATED ORAL at 20:42

## 2020-01-10 RX ADMIN — ACETAMINOPHEN 650 MG: 325 TABLET, FILM COATED ORAL at 06:36

## 2020-01-10 RX ADMIN — SODIUM CHLORIDE, PRESERVATIVE FREE 10 ML: 5 INJECTION INTRAVENOUS at 08:15

## 2020-01-10 RX ADMIN — SODIUM CHLORIDE, POTASSIUM CHLORIDE, SODIUM LACTATE AND CALCIUM CHLORIDE 150 ML/HR: 600; 310; 30; 20 INJECTION, SOLUTION INTRAVENOUS at 04:02

## 2020-01-10 RX ADMIN — ACETAMINOPHEN 650 MG: 325 TABLET, FILM COATED ORAL at 11:26

## 2020-01-10 RX ADMIN — BENZONATATE 100 MG: 100 CAPSULE ORAL at 06:36

## 2020-01-10 NOTE — CONSULTS
Inpatient General Surgery Consult  Consult performed by: Jose L Mathur MD  Consult ordered by: Martín Perdomo DO            Referring Provider: No ref. provider found    Reason for Consultation: Pancreatitis    Patient Care Team:  Provider, No Known as PCP - General    Chief complaint   Chief Complaint   Patient presents with   • Abdominal Pain   Nausea and vomiting    SUBJECTIVE:    History of present illness: Patient is 64-year-old white male who has known pancreatitis likely due to his drinking.  He states that he continues to have abdominal pain.  He points to the entire abdomen and states that it is sharp at times and states that it is severe at times.  Pain medication makes him feel better.  Eating has made him feel worse.    Review of Systems:    Review of Systems - General ROS: negative for - chills, fatigue, fever, hot flashes, malaise or night sweats  Psychological ROS: negative for - Depression or anxiety  HEENT ROS: negative for -  No nasal/oral pharynx drainage or pain. No acute visual complaints.  Respiratory ROS: negative for - Shortness of breath, cough or hemoptysis.  Cardiovascular ROS: negative for - Chest pain or palpitations. No edema  Gastrointestinal ROS: As per HPI  Genito-Urinary ROS: negative for - dysuria or hematuria  Musculoskeletal ROS: negative for - gait disturbance or muscle pain  Neurological ROS: negative for - dizziness, gait disturbance, memory loss, numbness/tingling or seizures  Skin: no rash    History  Past Medical History:   Diagnosis Date   • Bleeding nose    • Hypertension        Past Surgical History:   Procedure Laterality Date   • ABDOMINAL SURGERY         History reviewed. No pertinent family history.    Social History     Socioeconomic History   • Marital status: Single     Spouse name: Not on file   • Number of children: Not on file   • Years of education: Not on file   • Highest education level: Not on file   Tobacco Use   • Smoking status: Current  Every Day Smoker     Packs/day: 1.00     Types: Cigarettes   • Smokeless tobacco: Never Used   Substance and Sexual Activity   • Alcohol use: Yes     Comment: 1-2 daily   • Drug use: No   • Sexual activity: Defer       No Known Allergies    OBJECTIVE:    Medications  Current Facility-Administered Medications   Medication Dose Route Frequency Provider Last Rate Last Dose   • acetaminophen (TYLENOL) tablet 650 mg  650 mg Oral Q4H PRN Martín Perdomo DO   650 mg at 01/10/20 1126    Or   • acetaminophen (TYLENOL) 160 MG/5ML solution 650 mg  650 mg Oral Q4H PRN Martín Perdomo DO        Or   • acetaminophen (TYLENOL) suppository 650 mg  650 mg Rectal Q4H PRN Martín Perdomo DO       • benzonatate (TESSALON) capsule 100 mg  100 mg Oral TID PRN Sherly Stearns MD   100 mg at 01/10/20 0636   • bisacodyl (DULCOLAX) suppository 10 mg  10 mg Rectal Daily PRN Martín Perdomo DO       • Influenza Vac Subunit Quad (FLUCELVAX) injection 0.5 mL  0.5 mL Intramuscular During Hospitalization Martín Perdomo DO       • lactated ringers infusion  150 mL/hr Intravenous Continuous Martín Perdomo  mL/hr at 01/10/20 1125 150 mL/hr at 01/10/20 1125   • LORazepam (ATIVAN) tablet 1 mg  1 mg Oral Q2H PRN Martín Perdomo DO   1 mg at 01/09/20 2244    Or   • LORazepam (ATIVAN) injection 1 mg  1 mg Intravenous Q2H PRN Martín Perdomo DO        Or   • LORazepam (ATIVAN) tablet 2 mg  2 mg Oral Q1H PRN Martín Perdomo DO        Or   • LORazepam (ATIVAN) injection 2 mg  2 mg Intravenous Q1H PRN Martín Perdomo DO        Or   • LORazepam (ATIVAN) injection 2 mg  2 mg Intravenous Q15 Min PRN Martín Perdomo DO        Or   • LORazepam (ATIVAN) injection 2 mg  2 mg Intramuscular Q15 Min PRN Martín Perdomo DO       • Morphine sulfate (PF) injection 2 mg  2 mg Intravenous Q4H PRN Martín Perdomo DO   2 mg at 01/09/20 1819    And   •  naloxone (NARCAN) injection 0.4 mg  0.4 mg Intravenous Q5 Min PRN Martín Perdomo, DO       • nicotine (NICODERM CQ) 21 MG/24HR patch 1 patch  1 patch Transdermal Q24H Martín Perdomo, DO   1 patch at 01/09/20 1819   • ondansetron (ZOFRAN) injection 4 mg  4 mg Intravenous Q6H PRN Martín Perdomo, DO       • sodium chloride 0.9 % flush 10 mL  10 mL Intravenous PRN Martín Perdomo, DO       • sodium chloride 0.9 % flush 10 mL  10 mL Intravenous Q12H Martín Perdomo, DO   10 mL at 01/10/20 0815   • sodium chloride 0.9 % flush 10 mL  10 mL Intravenous PRN Martín Perdomo, DO             Vital Signs   Temp:  [97.9 °F (36.6 °C)-100.1 °F (37.8 °C)] 97.9 °F (36.6 °C)  Heart Rate:  [56-77] 77  Resp:  [16-20] 20  BP: ()/(54-81) 113/77    Physical Exam:     General Appearance:  Alert and cooperative, not in any acute distress.   Head:  Atraumatic and normocephalic, without obvious abnormality.   Eyes:          PERRLA, conjunctivae and sclerae normal, no Icterus.    Ears:  Ears appear intact with no abnormalities noted.   Respiratory/Lungs:   Breath sounds heard bilaterally equally.  No crackles or wheezing. No Pleural rub or bronchial breathing. Normal respiratory effort.    Cardiovascular/Heart:  Normal S1 and S2, no murmur. No edema   GI/Abdomen:    Abdomen with some mild nonspecific tenderness.  No surgical findings.  Nondistended.                Musculoskeletal/ Extremities:   Moves all extremities well   Skin: No bleeding, bruising or rash, no induration   Psychiatric : Alert and oriented ×3.  No depression or anxiety    Neurologic: Cranial nerves 2 - 12 grossly intact, sensation intact, Motor power is normal and equal bilaterally.     Results Review:  Lab Results (last 24 hours)     Procedure Component Value Units Date/Time    Basic Metabolic Panel [[id number]]  (Abnormal) Collected:  01/10/20 0603    Specimen:  Blood Updated:  01/10/20 0639     Glucose 121 mg/dL       BUN 9 mg/dL      Creatinine 0.75 mg/dL      Sodium 141 mmol/L      Potassium 3.6 mmol/L      Chloride 105 mmol/L      CO2 24.2 mmol/L      Calcium 8.3 mg/dL      eGFR Non African Amer 106 mL/min/1.73      BUN/Creatinine Ratio 12.0     Anion Gap 11.8 mmol/L     Narrative:       GFR Normal >60  Chronic Kidney Disease <60  Kidney Failure <15      CBC & Differential [[id number]] Collected:  01/10/20 0603    Specimen:  Blood Updated:  01/10/20 0630    Narrative:       The following orders were created for panel order CBC & Differential.  Procedure                               Abnormality         Status                     ---------                               -----------         ------                     CBC Auto Differential[[id number]]        Abnormal            Final result                 Please view results for these tests on the individual orders.    CBC Auto Differential [[id number]]  (Abnormal) Collected:  01/10/20 0603    Specimen:  Blood Updated:  01/10/20 0630     WBC 14.22 10*3/mm3      RBC 2.66 10*6/mm3      Hemoglobin 8.9 g/dL      Hematocrit 26.9 %      .1 fL      MCH 33.5 pg      MCHC 33.1 g/dL      RDW 13.3 %      RDW-SD 49.1 fl      MPV 9.0 fL      Platelets 260 10*3/mm3      Neutrophil % 79.3 %      Lymphocyte % 11.7 %      Monocyte % 7.1 %      Eosinophil % 0.7 %      Basophil % 0.4 %      Immature Grans % 0.8 %      Neutrophils, Absolute 11.29 10*3/mm3      Lymphocytes, Absolute 1.66 10*3/mm3      Monocytes, Absolute 1.01 10*3/mm3      Eosinophils, Absolute 0.10 10*3/mm3      Basophils, Absolute 0.05 10*3/mm3      Immature Grans, Absolute 0.11 10*3/mm3      nRBC 0.0 /100 WBC     POC Glucose Once [[id number]]  (Normal) Collected:  01/09/20 2231    Specimen:  Blood Updated:  01/09/20 2235     Glucose 118 mg/dL      Comment: Serial Number: [id number]:  [id number]         CT scan reviewed by myself including films and discussed with the radiologist.  I agree with the  interpretation.    ASSESSMENT/PLAN:      Acute on chronic pancreatitis (CMS/HCC)    Pseudocyst of pancreas    Patient with acute on chronic pancreatitis secondary to alcohol.  Also with a likely pseudocyst involving the liver as there appears to be a tract from the cyst to the towards the pancreas.  It has increased in size since the recent CT scan performed on January 30, 2019.  I recommend that he be held on clears for now.  He may need a post duodenal feeding tube if we he is unable to eat in the near future.  Due to the cyst likely pseudocyst I recommended transfer to [facility] and this will be done once there is a bed available.  No surgical indication at this time.  Incidentally the gallbladder ultrasound revealed no gallstones.    Jose L Mathur MD  01/10/20  3:33 PM

## 2020-01-10 NOTE — PLAN OF CARE
Problem: Patient Care Overview  Goal: Plan of Care Review  Outcome: Ongoing (interventions implemented as appropriate)  Flowsheets  Taken 1/10/2020 2126  Progress: improving  Outcome Summary: VSS. Pain controled with PRN pain medication. No complaints of nausea. PRN tessalon ordered for cough. Ativan given per CIWA scale. PRN tylenol given for low grade temp. Awaiting bed at .  Taken 1/9/2020 2000  Plan of Care Reviewed With: patient

## 2020-01-10 NOTE — PROGRESS NOTES
[facility]    Name:  Ambrosio Bang   Age:  64 y.o.  Sex:  male  :  1955  MRN:  [id number]   Visit Number:  [id number]  Admission Date:  2020  Date Of Service:  01/10/20  Primary Care Physician:  Provider, No Known     LOS: 1 day :  Patient Care Team:  Provider, No Known as PCP - General:    Chief Complaint:      Pancreatitis     Subjective / Interval History:     Patient resting comfortably in bed right now. Does continue to complain of abdominal pain and some nausea. No further episodes of vomiting. No family present. No acute events reported by nursing.     Review of Systems:     General ROS: Patient denies any fevers, chills or loss of consciousness.  Respiratory ROS: Denies cough or shortness of breath.  Cardiovascular ROS: Denies chest pain or palpitations. No history of exertional chest pain.  Gastrointestinal ROS: complains of nausea, abdominal pain. No vomiting. No diarrhea.  Neurological ROS: Denies any focal weakness. No loss of consciousness. Denies any numbness.  Dermatological ROS: Denies any redness or pruritis.    Vital Signs:    Temp:  [97.9 °F (36.6 °C)-100.1 °F (37.8 °C)] 97.9 °F (36.6 °C)  Heart Rate:  [56-77] 77  Resp:  [16-20] 20  BP: ()/(54-81) 113/77    Intake and output:    I/O last 3 completed shifts:  In: 1445 [I.V.:395; IV Piggyback:1050]  Out: 1150 [Urine:1150]  I/O this shift:  In: -   Out: 500 [Urine:500]    Physical Examination:    General Appearance:  Alert and cooperative, not in any acute distress.   Head:  Atraumatic and normocephalic, without obvious abnormality.   Eyes:          PERRLA, conjunctivae and sclerae normal, no Icterus. No pallor. Extraocular movements are within normal limits.   Neck: Supple, trachea midline   Lungs:   Chest shape is normal. Breath sounds heard bilaterally equally.  No crackles or wheezing.    Heart:  Normal S1 and S2, no murmur   Abdomen:   Bowel sounds present, mild diffuse  tenderness, non-distended   Extremities: no edema, no cyanosis, no clubbing.   Skin: No bleeding, bruising or rash.   Neurologic: Awake, alert and oriented times 3. Moves all 4 extremities equally.     Laboratory results:    Results from last 7 days   Lab Units 01/10/20  0603 01/09/20  1024   SODIUM mmol/L 141 137   POTASSIUM mmol/L 3.6 3.5   CHLORIDE mmol/L 105 98   CO2 mmol/L 24.2 24.3   BUN mg/dL 9 8   CREATININE mg/dL 0.75* 0.70*   CALCIUM mg/dL 8.3* 8.5*   BILIRUBIN mg/dL  --  1.6*   ALK PHOS U/L  --  124*   ALT (SGPT) U/L  --  47*   AST (SGOT) U/L  --  33   GLUCOSE mg/dL 121* 116*     Results from last 7 days   Lab Units 01/10/20  0603 01/09/20  1000   WBC 10*3/mm3 14.22* 13.51*   HEMOGLOBIN g/dL 8.9* 11.6*   HEMATOCRIT % 26.9* 35.1*   PLATELETS 10*3/mm3 260 356                   I have reviewed the patient's laboratory results.    Radiology results:    Imaging Results (Last 24 Hours)     ** No results found for the last 24 hours. **          I have reviewed the patient's radiology reports.    Medication Review:     I have reviewed the patients active and prn medications.       Acute on chronic pancreatitis (CMS/HCC)    Pseudocyst of pancreas      Assessment:    Acute on chronic pancreatitis with likely pseudocyst, secondary to alcohol use  Portal vein thrombosis  History of alcohol abuse       Plan:    Continue on medical floor.  Continue with symptomatic control.  Will give Zofran for nausea and morphine for pain as needed.  Additionally will hold off on anticoagulation due to risk of hemorrhagic pancreatitis.  Diet has been advanced to clears. Will monitor for alcohol withdrawal symptoms.  Patient is awaiting a bed at .  Once available we will transfer. Dr Castro is following.        Martín Perdomo DO  01/10/20  4:08 PM    Dictated utilizing Dragon dictation.

## 2020-01-10 NOTE — PLAN OF CARE
Problem: Patient Care Overview  Goal: Plan of Care Review  Outcome: Ongoing (interventions implemented as appropriate)  Flowsheets  Taken 1/10/2020 0434 by Nancy Mittal RN  Progress: improving  Outcome Summary: Pt advanced to clear liquids, stable all day. Awaiting transfer to   Taken 1/10/2020 0800 by Huey Black, RN  Plan of Care Reviewed With: patient

## 2020-01-10 NOTE — PROGRESS NOTES
Continued Stay Note  RUTHY Torres     Patient Name: Ambrosio Bang  MRN: [id number]  Today's Date: 1/10/2020    Admit Date: 1/9/2020    Discharge Plan     Row Name 01/10/20 1359       Plan    Plan  Transfer to [facility]     Patient/Family in Agreement with Plan  yes    Plan Comments  Spoke to pt regarding discharge plans He lives with his daughter Independent with ADLS Confirmed address and phone number He is waiting on  bed at [facility]         Discharge Codes    No documentation.       Expected Discharge Date and Time     Expected Discharge Date Expected Discharge Time    Jan 13, 2020             Jessica Montes RN

## 2020-01-11 NOTE — DISCHARGE SUMMARY
"    [facility]   DISCHARGE SUMMARY      Name:  Ambrosio Bang   Age:  64 y.o.  Sex:  male  :  1955  MRN:  [id number]   Visit Number:  [id number]    Admission Date:  2020  Date of Discharge:  1/10/2020  Primary Care Physician:  Provider, No Known    Important issues to note:    Portal vein thrombosis    Discharge Diagnoses:           Acute on chronic pancreatitis (CMS/HCC)    Pseudocyst of pancreas      Presenting Problem:    Acute on chronic pancreatitis (CMS/HCC) [K85.90, K86.1]     Consults:     Consults     Date and Time Order Name Status Description    2020 1538 Inpatient General Surgery Consult Completed         Consulting Physician(s)     Provider Relationship Specialty    Jose L Mathur MD Consulting Physician General Surgery          Procedures Performed:           History of presenting illness:      Patient is a 64-year-old male with history significant for chronic pain on opiates who presents to the emergency room tonight with complaints of intractable nausea and vomiting.  Patient has multiple ER admissions for the same complaint.  Patient was last seen in the emergency room on 2019 and diagnosed with pancreatitis.  He was discharged with instructions to stay on a clear liquid diet and to slowly advance as tolerable.  Patient is an extremely poor historian and it is difficult to obtain accurate history.  No family is present.  He cannot tell me when his last drink of alcohol was.  But states it was \"a while ago.\"  Patient mostly is just begging to eat.  I do spent a long time discussing with patient the risk of feeding and told him that he needed to be kept n.p.o.  Laboratory data was remarkable for significantly elevated lipase.  Work-up in the emergency room included a CT abdomen pelvis that showed noted inflammation around the pancreas with what appears to be a pseudocyst/abscess near the right posterior lobe of the liver.  Also noted on imaging was " partial occlusion of the portal vein.   was notified who recommended a higher level of care and  was contacted for transfer.  Per ER documentation,case with Dr. Robbins,  surgery and Dr. Moore,  [facility], they do feel patient needs to be at  for likely procedure.  Unfortunately there are no beds currently.    Hospitalist service was contacted for admission with transfer once bed becomes available.  Furthermore, Dr. Bose was contacted and recommended no anticoagulation at this time as this may cause hemorrhagic pancreatitis.  Currently patient is resting comfortably in bed.  He denies pain.        Vital Signs:    Temp:  [97.9 °F (36.6 °C)-100.1 °F (37.8 °C)] 98.7 °F (37.1 °C)  Heart Rate:  [56-78] 78  Resp:  [16-20] 16  BP: ()/(54-94) 128/94    Physical Exam:    General Appearance:  Alert and cooperative, not in any acute distress.   Head:  Atraumatic and normocephalic, without obvious abnormality.   Eyes:          PERRLA, conjunctivae and sclerae normal, no Icterus. No pallor. Extraocular movements are within normal limits.   Ears:  Ears appear intact with no abnormalities noted.   Throat: No oral lesions, no thrush, oral mucosa moist.   Neck: Supple, trachea midline, no thyromegaly, no carotid bruit.   Back:      Lungs:   Chest shape is normal. Breath sounds heard bilaterally equally.  No crackles or wheezing. No Pleural rub or bronchial breathing.   Heart:  Normal S1 and S2, no murmur, no gallop, no rub. No JVD.   Abdomen:   Diffusely tender, soft, nondistended    Extremities: no edema, no cyanosis, no clubbing.   Pulses: Pulses palpable and equal bilaterally.           Neurologic: Alert and oriented x 3. Moves all four limbs equally. No tremors. No facial asymetry.     Pertinent Lab Results:     Results from last 7 days   Lab Units 01/10/20  0603 01/09/20  1024   SODIUM mmol/L 141 137   POTASSIUM mmol/L 3.6 3.5   CHLORIDE mmol/L 105 98   CO2 mmol/L 24.2 24.3   BUN mg/dL 9 8   CREATININE mg/dL  0.75* 0.70*   CALCIUM mg/dL 8.3* 8.5*   BILIRUBIN mg/dL  --  1.6*   ALK PHOS U/L  --  124*   ALT (SGPT) U/L  --  47*   AST (SGOT) U/L  --  33   GLUCOSE mg/dL 121* 116*     Results from last 7 days   Lab Units 01/10/20  0603 01/09/20  1000   WBC 10*3/mm3 14.22* 13.51*   HEMOGLOBIN g/dL 8.9* 11.6*   HEMATOCRIT % 26.9* 35.1*   PLATELETS 10*3/mm3 260 356                     Results from last 7 days   Lab Units 01/09/20  1024   LIPASE U/L 861*         Results from last 7 days   Lab Units 01/09/20  1005   COLOR UA  Yellow   GLUCOSE UA  Negative   KETONES UA  Negative   LEUKOCYTES UA  Trace*   PH, URINE  5.5   BILIRUBIN UA  Negative   UROBILINOGEN UA  1.0 E.U./dL     Pain Management Panel     Pain Management Panel Latest Ref Rng & Units 1/9/2020    AMPHETAMINES SCREEN, URINE Negative Negative    BARBITURATES SCREEN Negative Negative    BENZODIAZEPINE SCREEN, URINE Negative Negative    BUPRENORPHINEUR Negative Negative    COCAINE SCREEN, URINE Negative Negative    METHADONE SCREEN, URINE Negative Negative    METHAMPHETAMINEUR Negative Negative              Pertinent Radiology Results:    Imaging Results (All)     Procedure Component Value Units Date/Time    CT Abdomen Pelvis With Contrast [[id number]] Collected:  01/09/20 1047     Updated:  01/09/20 1057    Narrative:       PROCEDURE: CT ABDOMEN PELVIS W CONTRAST-     HISTORY:  abd pain, n/v     COMPARISON: 12/30/2019.     TECHNIQUE: Multiple axial CT images were obtained from the lung bases  through the pubic symphysis following the administration of Isovue 300  contrast.      FINDINGS:      ABDOMEN: The lung bases are clear. The heart is normal in size. The  liver is diffusely hypodense consistent with fatty infiltration. There  is a transient hepatic attenuation difference involving the left lobe of  the liver and there is partial thrombosis of the portal vein best  appreciated on axial image 23 and coronal image 40 which is new compared  to the prior exam. There has been  interval increase in size of the  subcapsular fluid collection involving the posterior right lobe of the  liver which measures 6.9 x 3.9 cm. The spleen is unremarkable. No  adrenal mass is present.  The pancreas is atrophic and diffusely  calcified consistent with chronic pancreatitis. The previously noted  inflammatory stranding has improved, however there is persistent  inflammatory stranding adjacent to the head of the pancreas and there is  a small amount of fluid in the right paracolic gutter. The kidneys are  normal. The aorta is normal in caliber. There is no free fluid or  adenopathy. The abdominal portions of the GI tract are unremarkable with  no evidence of obstruction.     PELVIS: The appendix is not identified.  The urinary bladder is  unremarkable. There is no significant free fluid or adenopathy.       Impression:       1. Interval improvement in the patient's acute on chronic pancreatitis  with some persistent inflammatory stranding adjacent to the head of the  pancreas and a small amount of fluid in the right paracolic gutter.  2. Enlargement in the subcutaneous capsular fluid collection involving  the posterior right lobe of the liver which may reflect a pseudocyst or  abscess.  3. Partially occlusive thrombus within the portal vein.     409.36 mGy.cm        This study was performed with techniques to keep radiation doses as low  as reasonably achievable (ALARA). Individualized dose reduction  techniques using automated exposure control or adjustment of mA and/or  kV according to the patient size were employed.      This report was finalized on 1/9/2020 10:55 AM by Ragini Blanton M.D..          Condition on Discharge:      Stable.    Code status during the hospital stay:    Code Status and Medical Interventions:   Ordered at: 01/09/20 1538     Code Status:    CPR     Medical Interventions (Level of Support Prior to Arrest):    Full       Discharge Disposition:        Discharge Medications:        Discharge Medications      ASK your doctor about these medications      Instructions Start Date   ondansetron 4 MG tablet  Commonly known as:  ZOFRAN   4 mg, Oral, Every 6 Hours PRN      oxyCODONE-acetaminophen 5-325 MG per tablet  Commonly known as:  PERCOCET   1 tablet, Oral, Every 6 Hours PRN             Discharge Diet:         Activity at Discharge:         Follow-up Appointments:        No future appointments.        Test Results Pending at Discharge:     Order Current Status    Green Top (No Gel) In process    [place] Draw In process             Martín Perdomo DO  01/10/20  9:48 PM    Time spent: greater than 30 min    Dictated utilizing Dragon dictation.

## 2020-01-11 NOTE — PROGRESS NOTES
Case Management Discharge Note      Final Note: [facility]    Provided post acute provider list?: (not needed )    Destination - Selection Complete      Service Provider Request Status Selected Services Address Phone Number Fax Number    [facility] [address] [place] [phone] --      Durable Medical Equipment      No service has been selected for the patient.      Dialysis/Infusion      No service has been selected for the patient.      Home Medical Care      No service has been selected for the patient.      Therapy      No service has been selected for the patient.      Community Resources      No service has been selected for the patient.        Transportation Services  Ambulance: [facility]    Final Discharge Disposition Code: 02 - short [facility] for [facility]

## 2020-01-26 ENCOUNTER — HOSPITAL ENCOUNTER (EMERGENCY)
Facility: HOSPITAL | Age: 65
Discharge: HOME | End: 2020-01-26
Attending: EMERGENCY MEDICINE | Admitting: EMERGENCY MEDICINE

## 2020-01-26 VITALS
TEMPERATURE: 97.9 F | RESPIRATION RATE: 16 BRPM | OXYGEN SATURATION: 100 % | HEART RATE: 88 BPM | HEIGHT: 66 IN | WEIGHT: 115 LBS | DIASTOLIC BLOOD PRESSURE: 71 MMHG | SYSTOLIC BLOOD PRESSURE: 119 MMHG | BODY MASS INDEX: 18.48 KG/M2

## 2020-01-26 DIAGNOSIS — G89.18 POST-OP PAIN: Primary | ICD-10-CM

## 2020-01-26 PROCEDURE — 99283 EMERGENCY DEPT VISIT LOW MDM: CPT

## 2020-01-26 NOTE — ED PROVIDER NOTES
Subjective   History of Present Illness  Is a 64-year-old gentleman who comes in today complaining of pain to his surgical site.  He underwent a IR drain to his right hepatic lobe secondary to a an abscess.  This was done January 11.  Since that time he has had a drain in place and been treated by [facility] surgical team.  He was seen on 116 and all cultures were negative.  He reports they have plans to remove the drain in 2 days.  However, his daughter went to flush it and noted that he may have had some leakage around the site.  He denies any fever or chills.  He also has run out of his narcotic pain medication and would like a refill.  The pain that he is complaining of today is around the site where the drain enters the skin.  Review of Systems   Constitutional: Negative.    HENT: Negative.    Eyes: Negative.    Respiratory: Negative.    Cardiovascular: Negative.    Gastrointestinal: Negative.    Genitourinary: Negative.    Skin: Positive for wound.   Neurological: Negative.    Psychiatric/Behavioral: Negative.        Past Medical History:   Diagnosis Date   • Bleeding nose    • Hypertension        No Known Allergies    Past Surgical History:   Procedure Laterality Date   • ABDOMINAL SURGERY         History reviewed. No pertinent family history.    Social History     Socioeconomic History   • Marital status: Single     Spouse name: Not on file   • Number of children: Not on file   • Years of education: Not on file   • Highest education level: Not on file   Tobacco Use   • Smoking status: Current Every Day Smoker     Packs/day: 1.00     Types: Cigarettes   • Smokeless tobacco: Never Used   Substance and Sexual Activity   • Alcohol use: Yes     Comment: 1-2 daily   • Drug use: No   • Sexual activity: Defer           Objective   Physical Exam   Constitutional: He appears well-developed and well-nourished.   Skin:        Nursing note and vitals reviewed.  GEN: No acute distress  Head: Normocephalic, atraumatic  Eyes:  Pupils equal round reactive to light  ENT: Posterior pharynx normal in appearance, oral mucosa is moist  Chest: Nontender to palpation  Cardiovascular: Regular rate  Lungs: Clear to auscultation bilaterally  Abdomen: Soft, nontender, nondistended, no peritoneal signs  Extremities: No edema, normal appearance  Neuro: GCS 15  Psych: Mood and affect are appropriate      Procedures           ED Course                                               MDM  Number of Diagnoses or Management Options  Post-op pain:   Diagnosis management comments: After reviewing records from [facility] we will refer him back to his surgeon to evaluate and remove his drain. I advised him we could not give additional pain medication for surgical site pain after he is already been treated by the surgeon for the same thing.        Amount and/or Complexity of Data Reviewed  Review and summarize past medical records: yes  Discuss the patient with other providers: yes    Risk of Complications, Morbidity, and/or Mortality  Presenting problems: minimal  Diagnostic procedures: minimal  Management options: minimal        Final diagnoses:   Post-op pain            Nina Verduzco, APRN  01/26/20 7617

## 2020-01-26 NOTE — DISCHARGE INSTRUCTIONS
Follow up with your provider who performed your surgery to assist with your pain control. Take otc medications as needed. Avoid product containing tylenol.

## 2020-10-23 ENCOUNTER — HOSPITAL ENCOUNTER (EMERGENCY)
Facility: HOSPITAL | Age: 65
Discharge: HOME | End: 2020-10-23
Attending: STUDENT IN AN ORGANIZED HEALTH CARE EDUCATION/TRAINING PROGRAM | Admitting: STUDENT IN AN ORGANIZED HEALTH CARE EDUCATION/TRAINING PROGRAM

## 2020-10-23 VITALS
WEIGHT: 120 LBS | BODY MASS INDEX: 19.29 KG/M2 | RESPIRATION RATE: 18 BRPM | DIASTOLIC BLOOD PRESSURE: 96 MMHG | OXYGEN SATURATION: 96 % | HEIGHT: 66 IN | SYSTOLIC BLOOD PRESSURE: 146 MMHG | HEART RATE: 81 BPM | TEMPERATURE: 97.8 F

## 2020-10-23 DIAGNOSIS — L50.9 URTICARIA: Primary | ICD-10-CM

## 2020-10-23 PROCEDURE — 99283 EMERGENCY DEPT VISIT LOW MDM: CPT

## 2020-10-23 PROCEDURE — 25010000002 DEXAMETHASONE PER 1 MG: Performed by: STUDENT IN AN ORGANIZED HEALTH CARE EDUCATION/TRAINING PROGRAM

## 2020-10-23 RX ORDER — HYDROXYZINE HYDROCHLORIDE 25 MG/1
50 TABLET, FILM COATED ORAL ONCE
Status: COMPLETED | OUTPATIENT
Start: 2020-10-23 | End: 2020-10-23

## 2020-10-23 RX ORDER — HYDROXYZINE HYDROCHLORIDE 25 MG/1
25 TABLET, FILM COATED ORAL EVERY 6 HOURS PRN
Qty: 20 TABLET | Refills: 0 | Status: SHIPPED | OUTPATIENT
Start: 2020-10-23 | End: 2021-06-02

## 2020-10-23 RX ORDER — FAMOTIDINE 20 MG/1
20 TABLET, FILM COATED ORAL ONCE
Status: COMPLETED | OUTPATIENT
Start: 2020-10-23 | End: 2020-10-23

## 2020-10-23 RX ADMIN — FAMOTIDINE 20 MG: 20 TABLET, FILM COATED ORAL at 01:05

## 2020-10-23 RX ADMIN — DEXAMETHASONE SODIUM PHOSPHATE 10 MG: 10 INJECTION, SOLUTION INTRAMUSCULAR; INTRAVENOUS at 01:05

## 2020-10-23 RX ADMIN — HYDROXYZINE HYDROCHLORIDE 50 MG: 25 TABLET, FILM COATED ORAL at 01:06

## 2020-10-23 NOTE — ED PROVIDER NOTES
Subjective   65-year-old intoxicated male who presents via EMS for an allergic reaction.  The patient states that ever since he got into intermediate the last time he cannot eat meat.  States every time he eats meat he breaks out into an itchy rash.  Patient states his rash started this evening approximately 2 hours ago and has progressively worsened.  Nothing makes it better or worse.  He did take 2 Benadryl earlier.  Patient has no shortness of breath, no lip or tongue swelling, no nausea or vomiting, or abdominal pain.  He states that he just wants to scratch at his legs.          Review of Systems   All other systems reviewed and are negative.      Past Medical History:   Diagnosis Date   • Bleeding nose    • Hypertension    • Pancreatitis        No Known Allergies    Past Surgical History:   Procedure Laterality Date   • ABDOMINAL SURGERY         History reviewed. No pertinent family history.    Social History     Socioeconomic History   • Marital status: Single     Spouse name: Not on file   • Number of children: Not on file   • Years of education: Not on file   • Highest education level: Not on file   Tobacco Use   • Smoking status: Current Every Day Smoker     Packs/day: 1.00     Types: Cigarettes   • Smokeless tobacco: Never Used   Substance and Sexual Activity   • Alcohol use: Yes     Comment: 1-2 daily   • Drug use: No   • Sexual activity: Defer           Objective   Physical Exam  Vitals signs and nursing note reviewed.     GEN: No acute distress  Skin: Majority of his body is covered in an urticarial rash  Head: Normocephalic, atraumatic  Eyes: Pupils equal round reactive to light  ENT: Posterior pharynx normal in appearance, oral mucosa is moist  Chest: Nontender to palpation  Cardiovascular: Regular rate  Lungs: Clear to auscultation bilaterally  Abdomen: Soft, nontender, nondistended, no peritoneal signs  Extremities: Skin is covered with an urticarial rash on extremities.  It is blanchable, erythematous,  and palpable.  It is not weeping   neuro: GCS 15  Psych: Mood and affect are appropriate    Procedures           ED Course                                           MDM  Number of Diagnoses or Management Options  Urticaria:   Diagnosis management comments: Given dexamethasone 10 mg orally as well as famotidine and Atarax orally.  Will prescribe Atarax to control symptoms.  Did give a referral to allergy to better determine what is causing this issue.       Amount and/or Complexity of Data Reviewed  Decide to obtain previous medical records or to obtain history from someone other than the patient: yes  Obtain history from someone other than the patient: yes  Review and summarize past medical records: yes        Final diagnoses:   Urticaria            Santana Cortez MD  10/23/20 0205

## 2020-10-23 NOTE — ED NOTES
Spoke with daughter Clara she is going to try to find the pt transportation home.      Ben Fish, RN  10/23/20 0140

## 2020-12-26 ENCOUNTER — APPOINTMENT (OUTPATIENT)
Dept: GENERAL RADIOLOGY | Facility: HOSPITAL | Age: 65
End: 2020-12-26

## 2020-12-26 ENCOUNTER — HOSPITAL ENCOUNTER (EMERGENCY)
Facility: HOSPITAL | Age: 65
Discharge: LEFT BEFORE DISCHARGE | End: 2020-12-26
Attending: STUDENT IN AN ORGANIZED HEALTH CARE EDUCATION/TRAINING PROGRAM | Admitting: EMERGENCY MEDICINE

## 2020-12-26 VITALS
RESPIRATION RATE: 20 BRPM | HEART RATE: 70 BPM | TEMPERATURE: 98.8 F | WEIGHT: 125 LBS | BODY MASS INDEX: 21.34 KG/M2 | HEIGHT: 64 IN | SYSTOLIC BLOOD PRESSURE: 114 MMHG | OXYGEN SATURATION: 95 % | DIASTOLIC BLOOD PRESSURE: 77 MMHG

## 2020-12-26 DIAGNOSIS — F10.920 ALCOHOLIC INTOXICATION WITHOUT COMPLICATION (HCC): ICD-10-CM

## 2020-12-26 DIAGNOSIS — R07.9 CHEST PAIN, UNSPECIFIED TYPE: Primary | ICD-10-CM

## 2020-12-26 LAB
ALBUMIN SERPL-MCNC: 4.2 G/DL (ref 3.5–5.2)
ALBUMIN/GLOB SERPL: 1.2 G/DL
ALP SERPL-CCNC: 97 U/L (ref 39–117)
ALT SERPL W P-5'-P-CCNC: 21 U/L (ref 1–41)
ANION GAP SERPL CALCULATED.3IONS-SCNC: 18.6 MMOL/L (ref 5–15)
AST SERPL-CCNC: 38 U/L (ref 1–40)
BASOPHILS # BLD AUTO: 0.04 10*3/MM3 (ref 0–0.2)
BASOPHILS NFR BLD AUTO: 0.5 % (ref 0–1.5)
BILIRUB SERPL-MCNC: 1 MG/DL (ref 0–1.2)
BUN SERPL-MCNC: 6 MG/DL (ref 8–23)
BUN/CREAT SERPL: 8.3 (ref 7–25)
CALCIUM SPEC-SCNC: 9.8 MG/DL (ref 8.6–10.5)
CHLORIDE SERPL-SCNC: 104 MMOL/L (ref 98–107)
CO2 SERPL-SCNC: 17.4 MMOL/L (ref 22–29)
CREAT SERPL-MCNC: 0.72 MG/DL (ref 0.76–1.27)
DEPRECATED RDW RBC AUTO: 44.7 FL (ref 37–54)
EOSINOPHIL # BLD AUTO: 0.41 10*3/MM3 (ref 0–0.4)
EOSINOPHIL NFR BLD AUTO: 4.8 % (ref 0.3–6.2)
ERYTHROCYTE [DISTWIDTH] IN BLOOD BY AUTOMATED COUNT: 12.4 % (ref 12.3–15.4)
ETHANOL BLD-MCNC: 320 MG/DL (ref 0–10)
ETHANOL UR QL: 0.32 %
GFR SERPL CREATININE-BSD FRML MDRD: 110 ML/MIN/1.73
GLOBULIN UR ELPH-MCNC: 3.6 GM/DL
GLUCOSE SERPL-MCNC: 102 MG/DL (ref 65–99)
HCT VFR BLD AUTO: 47.1 % (ref 37.5–51)
HGB BLD-MCNC: 15.5 G/DL (ref 13–17.7)
IMM GRANULOCYTES # BLD AUTO: 0.03 10*3/MM3 (ref 0–0.05)
IMM GRANULOCYTES NFR BLD AUTO: 0.4 % (ref 0–0.5)
LIPASE SERPL-CCNC: 128 U/L (ref 13–60)
LYMPHOCYTES # BLD AUTO: 2.99 10*3/MM3 (ref 0.7–3.1)
LYMPHOCYTES NFR BLD AUTO: 35.2 % (ref 19.6–45.3)
MCH RBC QN AUTO: 32.3 PG (ref 26.6–33)
MCHC RBC AUTO-ENTMCNC: 32.9 G/DL (ref 31.5–35.7)
MCV RBC AUTO: 98.1 FL (ref 79–97)
MONOCYTES # BLD AUTO: 0.64 10*3/MM3 (ref 0.1–0.9)
MONOCYTES NFR BLD AUTO: 7.5 % (ref 5–12)
NEUTROPHILS NFR BLD AUTO: 4.38 10*3/MM3 (ref 1.7–7)
NEUTROPHILS NFR BLD AUTO: 51.6 % (ref 42.7–76)
NRBC BLD AUTO-RTO: 0 /100 WBC (ref 0–0.2)
PLATELET # BLD AUTO: 170 10*3/MM3 (ref 140–450)
PMV BLD AUTO: 8.7 FL (ref 6–12)
POTASSIUM SERPL-SCNC: 4.1 MMOL/L (ref 3.5–5.2)
PROT SERPL-MCNC: 7.8 G/DL (ref 6–8.5)
RBC # BLD AUTO: 4.8 10*6/MM3 (ref 4.14–5.8)
SODIUM SERPL-SCNC: 140 MMOL/L (ref 136–145)
TROPONIN T SERPL-MCNC: <0.01 NG/ML (ref 0–0.03)
TROPONIN T SERPL-MCNC: <0.01 NG/ML (ref 0–0.03)
WBC # BLD AUTO: 8.49 10*3/MM3 (ref 3.4–10.8)

## 2020-12-26 PROCEDURE — 93005 ELECTROCARDIOGRAM TRACING: CPT | Performed by: STUDENT IN AN ORGANIZED HEALTH CARE EDUCATION/TRAINING PROGRAM

## 2020-12-26 PROCEDURE — 83690 ASSAY OF LIPASE: CPT | Performed by: STUDENT IN AN ORGANIZED HEALTH CARE EDUCATION/TRAINING PROGRAM

## 2020-12-26 PROCEDURE — 85025 COMPLETE CBC W/AUTO DIFF WBC: CPT | Performed by: STUDENT IN AN ORGANIZED HEALTH CARE EDUCATION/TRAINING PROGRAM

## 2020-12-26 PROCEDURE — 80307 DRUG TEST PRSMV CHEM ANLYZR: CPT | Performed by: STUDENT IN AN ORGANIZED HEALTH CARE EDUCATION/TRAINING PROGRAM

## 2020-12-26 PROCEDURE — 99283 EMERGENCY DEPT VISIT LOW MDM: CPT

## 2020-12-26 PROCEDURE — 71045 X-RAY EXAM CHEST 1 VIEW: CPT

## 2020-12-26 PROCEDURE — 80053 COMPREHEN METABOLIC PANEL: CPT | Performed by: STUDENT IN AN ORGANIZED HEALTH CARE EDUCATION/TRAINING PROGRAM

## 2020-12-26 PROCEDURE — 84484 ASSAY OF TROPONIN QUANT: CPT | Performed by: STUDENT IN AN ORGANIZED HEALTH CARE EDUCATION/TRAINING PROGRAM

## 2020-12-26 PROCEDURE — 25010000002 KETOROLAC TROMETHAMINE PER 15 MG: Performed by: EMERGENCY MEDICINE

## 2020-12-26 PROCEDURE — 84484 ASSAY OF TROPONIN QUANT: CPT | Performed by: EMERGENCY MEDICINE

## 2020-12-26 PROCEDURE — 96374 THER/PROPH/DIAG INJ IV PUSH: CPT

## 2020-12-26 RX ORDER — KETOROLAC TROMETHAMINE 30 MG/ML
30 INJECTION, SOLUTION INTRAMUSCULAR; INTRAVENOUS EVERY 6 HOURS PRN
Status: DISCONTINUED | OUTPATIENT
Start: 2020-12-26 | End: 2020-12-27 | Stop reason: HOSPADM

## 2020-12-26 RX ADMIN — KETOROLAC TROMETHAMINE 30 MG: 30 INJECTION, SOLUTION INTRAMUSCULAR at 20:09

## 2021-01-26 ENCOUNTER — APPOINTMENT (OUTPATIENT)
Dept: CT IMAGING | Facility: HOSPITAL | Age: 66
End: 2021-01-26

## 2021-01-26 ENCOUNTER — HOSPITAL ENCOUNTER (EMERGENCY)
Facility: HOSPITAL | Age: 66
Discharge: ANOTHER HOSPITAL | End: 2021-01-26
Attending: EMERGENCY MEDICINE | Admitting: EMERGENCY MEDICINE

## 2021-01-26 ENCOUNTER — APPOINTMENT (OUTPATIENT)
Dept: GENERAL RADIOLOGY | Facility: HOSPITAL | Age: 66
End: 2021-01-26

## 2021-01-26 VITALS
OXYGEN SATURATION: 99 % | BODY MASS INDEX: 20.09 KG/M2 | SYSTOLIC BLOOD PRESSURE: 119 MMHG | WEIGHT: 125 LBS | HEART RATE: 82 BPM | RESPIRATION RATE: 18 BRPM | DIASTOLIC BLOOD PRESSURE: 70 MMHG | HEIGHT: 66 IN | TEMPERATURE: 98.2 F

## 2021-01-26 DIAGNOSIS — K85.90 HEMORRHAGIC PANCREATITIS: Primary | ICD-10-CM

## 2021-01-26 LAB
ALBUMIN SERPL-MCNC: 3.8 G/DL (ref 3.5–5.2)
ALBUMIN/GLOB SERPL: 1.7 G/DL
ALP SERPL-CCNC: 101 U/L (ref 39–117)
ALT SERPL W P-5'-P-CCNC: 18 U/L (ref 1–41)
AMPHET+METHAMPHET UR QL: NEGATIVE
AMPHETAMINES UR QL: NEGATIVE
ANION GAP SERPL CALCULATED.3IONS-SCNC: 16.2 MMOL/L (ref 5–15)
APTT PPP: 24.2 SECONDS (ref 24.5–37.2)
AST SERPL-CCNC: 34 U/L (ref 1–40)
BACTERIA UR QL AUTO: ABNORMAL /HPF
BARBITURATES UR QL SCN: NEGATIVE
BASOPHILS # BLD AUTO: 0.03 10*3/MM3 (ref 0–0.2)
BASOPHILS NFR BLD AUTO: 0.3 % (ref 0–1.5)
BENZODIAZ UR QL SCN: NEGATIVE
BILIRUB SERPL-MCNC: 3 MG/DL (ref 0–1.2)
BILIRUB UR QL STRIP: ABNORMAL
BUN SERPL-MCNC: 10 MG/DL (ref 8–23)
BUN/CREAT SERPL: 16.1 (ref 7–25)
BUPRENORPHINE SERPL-MCNC: NEGATIVE NG/ML
CALCIUM SPEC-SCNC: 8.9 MG/DL (ref 8.6–10.5)
CANNABINOIDS SERPL QL: NEGATIVE
CHLORIDE SERPL-SCNC: 99 MMOL/L (ref 98–107)
CK SERPL-CCNC: 45 U/L (ref 20–200)
CLARITY UR: CLEAR
CO2 SERPL-SCNC: 19.8 MMOL/L (ref 22–29)
COCAINE UR QL: NEGATIVE
COLOR UR: ABNORMAL
CREAT SERPL-MCNC: 0.62 MG/DL (ref 0.76–1.27)
DEPRECATED RDW RBC AUTO: 53.1 FL (ref 37–54)
EOSINOPHIL # BLD AUTO: 0.14 10*3/MM3 (ref 0–0.4)
EOSINOPHIL NFR BLD AUTO: 1.5 % (ref 0.3–6.2)
ERYTHROCYTE [DISTWIDTH] IN BLOOD BY AUTOMATED COUNT: 14.6 % (ref 12.3–15.4)
ETHANOL BLD-MCNC: <10 MG/DL (ref 0–10)
ETHANOL UR QL: <0.01 %
GFR SERPL CREATININE-BSD FRML MDRD: 131 ML/MIN/1.73
GLOBULIN UR ELPH-MCNC: 2.3 GM/DL
GLUCOSE SERPL-MCNC: 158 MG/DL (ref 65–99)
GLUCOSE UR STRIP-MCNC: NEGATIVE MG/DL
HCT VFR BLD AUTO: 29.8 % (ref 37.5–51)
HGB BLD-MCNC: 9.9 G/DL (ref 13–17.7)
HGB UR QL STRIP.AUTO: NEGATIVE
HOLD SPECIMEN: NORMAL
HOLD SPECIMEN: NORMAL
HYALINE CASTS UR QL AUTO: ABNORMAL /LPF
IMM GRANULOCYTES # BLD AUTO: 0.02 10*3/MM3 (ref 0–0.05)
IMM GRANULOCYTES NFR BLD AUTO: 0.2 % (ref 0–0.5)
INR PPP: 1.1 (ref 0.9–1.1)
KETONES UR QL STRIP: ABNORMAL
LEUKOCYTE ESTERASE UR QL STRIP.AUTO: ABNORMAL
LIPASE SERPL-CCNC: 208 U/L (ref 13–60)
LYMPHOCYTES # BLD AUTO: 1.72 10*3/MM3 (ref 0.7–3.1)
LYMPHOCYTES NFR BLD AUTO: 18.2 % (ref 19.6–45.3)
MAGNESIUM SERPL-MCNC: 1.8 MG/DL (ref 1.6–2.4)
MCH RBC QN AUTO: 33.2 PG (ref 26.6–33)
MCHC RBC AUTO-ENTMCNC: 33.2 G/DL (ref 31.5–35.7)
MCV RBC AUTO: 100 FL (ref 79–97)
METHADONE UR QL SCN: NEGATIVE
MONOCYTES # BLD AUTO: 0.83 10*3/MM3 (ref 0.1–0.9)
MONOCYTES NFR BLD AUTO: 8.8 % (ref 5–12)
MUCOUS THREADS URNS QL MICRO: ABNORMAL /HPF
NEUTROPHILS NFR BLD AUTO: 6.69 10*3/MM3 (ref 1.7–7)
NEUTROPHILS NFR BLD AUTO: 71 % (ref 42.7–76)
NITRITE UR QL STRIP: POSITIVE
NRBC BLD AUTO-RTO: 0 /100 WBC (ref 0–0.2)
NT-PROBNP SERPL-MCNC: 49.2 PG/ML (ref 0–900)
OPIATES UR QL: NEGATIVE
OXYCODONE UR QL SCN: NEGATIVE
PCP UR QL SCN: NEGATIVE
PH UR STRIP.AUTO: 5.5 [PH] (ref 5–8)
PLATELET # BLD AUTO: 150 10*3/MM3 (ref 140–450)
PMV BLD AUTO: 9.3 FL (ref 6–12)
POTASSIUM SERPL-SCNC: 3.7 MMOL/L (ref 3.5–5.2)
PROPOXYPH UR QL: NEGATIVE
PROT SERPL-MCNC: 6.1 G/DL (ref 6–8.5)
PROT UR QL STRIP: ABNORMAL
PROTHROMBIN TIME: 14.7 SECONDS (ref 12–15.1)
RBC # BLD AUTO: 2.98 10*6/MM3 (ref 4.14–5.8)
RBC # UR: ABNORMAL /HPF
REF LAB TEST METHOD: ABNORMAL
SODIUM SERPL-SCNC: 135 MMOL/L (ref 136–145)
SP GR UR STRIP: >1.03 (ref 1–1.03)
SQUAMOUS #/AREA URNS HPF: ABNORMAL /HPF
TRICYCLICS UR QL SCN: NEGATIVE
TROPONIN T SERPL-MCNC: <0.01 NG/ML (ref 0–0.03)
UROBILINOGEN UR QL STRIP: ABNORMAL
WBC # BLD AUTO: 9.43 10*3/MM3 (ref 3.4–10.8)
WBC UR QL AUTO: ABNORMAL /HPF
WHOLE BLOOD HOLD SPECIMEN: NORMAL

## 2021-01-26 PROCEDURE — 25010000002 CEFTRIAXONE SODIUM-DEXTROSE 1-3.74 GM-%(50ML) RECONSTITUTED SOLUTION: Performed by: NURSE PRACTITIONER

## 2021-01-26 PROCEDURE — 71045 X-RAY EXAM CHEST 1 VIEW: CPT

## 2021-01-26 PROCEDURE — 80306 DRUG TEST PRSMV INSTRMNT: CPT | Performed by: NURSE PRACTITIONER

## 2021-01-26 PROCEDURE — 83735 ASSAY OF MAGNESIUM: CPT | Performed by: NURSE PRACTITIONER

## 2021-01-26 PROCEDURE — 96365 THER/PROPH/DIAG IV INF INIT: CPT

## 2021-01-26 PROCEDURE — 99285 EMERGENCY DEPT VISIT HI MDM: CPT

## 2021-01-26 PROCEDURE — 85610 PROTHROMBIN TIME: CPT | Performed by: NURSE PRACTITIONER

## 2021-01-26 PROCEDURE — 25010000002 LORAZEPAM PER 2 MG: Performed by: NURSE PRACTITIONER

## 2021-01-26 PROCEDURE — 83880 ASSAY OF NATRIURETIC PEPTIDE: CPT | Performed by: NURSE PRACTITIONER

## 2021-01-26 PROCEDURE — 82077 ASSAY SPEC XCP UR&BREATH IA: CPT | Performed by: NURSE PRACTITIONER

## 2021-01-26 PROCEDURE — 25010000002 FENTANYL CITRATE (PF) 100 MCG/2ML SOLUTION: Performed by: NURSE PRACTITIONER

## 2021-01-26 PROCEDURE — 84484 ASSAY OF TROPONIN QUANT: CPT | Performed by: EMERGENCY MEDICINE

## 2021-01-26 PROCEDURE — 25010000002 MORPHINE PER 10 MG: Performed by: NURSE PRACTITIONER

## 2021-01-26 PROCEDURE — 82550 ASSAY OF CK (CPK): CPT | Performed by: NURSE PRACTITIONER

## 2021-01-26 PROCEDURE — 25010000002 IOPAMIDOL 61 % SOLUTION: Performed by: EMERGENCY MEDICINE

## 2021-01-26 PROCEDURE — 85025 COMPLETE CBC W/AUTO DIFF WBC: CPT | Performed by: EMERGENCY MEDICINE

## 2021-01-26 PROCEDURE — 85730 THROMBOPLASTIN TIME PARTIAL: CPT | Performed by: NURSE PRACTITIONER

## 2021-01-26 PROCEDURE — 25010000002 ONDANSETRON PER 1 MG: Performed by: NURSE PRACTITIONER

## 2021-01-26 PROCEDURE — 96375 TX/PRO/DX INJ NEW DRUG ADDON: CPT

## 2021-01-26 PROCEDURE — 93005 ELECTROCARDIOGRAM TRACING: CPT | Performed by: EMERGENCY MEDICINE

## 2021-01-26 PROCEDURE — 74177 CT ABD & PELVIS W/CONTRAST: CPT

## 2021-01-26 PROCEDURE — 80053 COMPREHEN METABOLIC PANEL: CPT | Performed by: EMERGENCY MEDICINE

## 2021-01-26 PROCEDURE — 83690 ASSAY OF LIPASE: CPT | Performed by: NURSE PRACTITIONER

## 2021-01-26 PROCEDURE — 81001 URINALYSIS AUTO W/SCOPE: CPT | Performed by: NURSE PRACTITIONER

## 2021-01-26 RX ORDER — LORAZEPAM 2 MG/ML
1 INJECTION INTRAMUSCULAR ONCE
Status: COMPLETED | OUTPATIENT
Start: 2021-01-26 | End: 2021-01-26

## 2021-01-26 RX ORDER — FENTANYL CITRATE 50 UG/ML
50 INJECTION, SOLUTION INTRAMUSCULAR; INTRAVENOUS ONCE
Status: COMPLETED | OUTPATIENT
Start: 2021-01-26 | End: 2021-01-26

## 2021-01-26 RX ORDER — SODIUM CHLORIDE 0.9 % (FLUSH) 0.9 %
10 SYRINGE (ML) INJECTION AS NEEDED
Status: DISCONTINUED | OUTPATIENT
Start: 2021-01-26 | End: 2021-01-26 | Stop reason: HOSPADM

## 2021-01-26 RX ORDER — MORPHINE SULFATE 4 MG/ML
4 INJECTION, SOLUTION INTRAMUSCULAR; INTRAVENOUS ONCE
Status: COMPLETED | OUTPATIENT
Start: 2021-01-26 | End: 2021-01-26

## 2021-01-26 RX ORDER — CEFTRIAXONE 1 G/50ML
1 INJECTION, SOLUTION INTRAVENOUS ONCE
Status: COMPLETED | OUTPATIENT
Start: 2021-01-26 | End: 2021-01-26

## 2021-01-26 RX ORDER — ONDANSETRON 2 MG/ML
4 INJECTION INTRAMUSCULAR; INTRAVENOUS ONCE
Status: COMPLETED | OUTPATIENT
Start: 2021-01-26 | End: 2021-01-26

## 2021-01-26 RX ADMIN — FENTANYL CITRATE 50 MCG: 50 INJECTION, SOLUTION INTRAMUSCULAR; INTRAVENOUS at 11:55

## 2021-01-26 RX ADMIN — IOPAMIDOL 100 ML: 612 INJECTION, SOLUTION INTRAVENOUS at 12:42

## 2021-01-26 RX ADMIN — ONDANSETRON 4 MG: 2 INJECTION INTRAMUSCULAR; INTRAVENOUS at 11:55

## 2021-01-26 RX ADMIN — SODIUM CHLORIDE 1000 ML: 9 INJECTION, SOLUTION INTRAVENOUS at 11:55

## 2021-01-26 RX ADMIN — CEFTRIAXONE 1 G: 1 INJECTION, SOLUTION INTRAVENOUS at 14:40

## 2021-01-26 RX ADMIN — MORPHINE SULFATE 4 MG: 4 INJECTION, SOLUTION INTRAMUSCULAR; INTRAVENOUS at 14:40

## 2021-01-26 RX ADMIN — LORAZEPAM 1 MG: 2 INJECTION, SOLUTION INTRAMUSCULAR; INTRAVENOUS at 18:40

## 2021-02-12 ENCOUNTER — HOSPITAL ENCOUNTER (EMERGENCY)
Facility: HOSPITAL | Age: 66
Discharge: HOME | End: 2021-02-12
Attending: EMERGENCY MEDICINE | Admitting: EMERGENCY MEDICINE

## 2021-02-12 ENCOUNTER — APPOINTMENT (OUTPATIENT)
Dept: GENERAL RADIOLOGY | Facility: HOSPITAL | Age: 66
End: 2021-02-12

## 2021-02-12 ENCOUNTER — APPOINTMENT (OUTPATIENT)
Dept: CT IMAGING | Facility: HOSPITAL | Age: 66
End: 2021-02-12

## 2021-02-12 VITALS
HEART RATE: 65 BPM | HEIGHT: 66 IN | SYSTOLIC BLOOD PRESSURE: 112 MMHG | WEIGHT: 125 LBS | RESPIRATION RATE: 15 BRPM | TEMPERATURE: 97.8 F | OXYGEN SATURATION: 99 % | BODY MASS INDEX: 20.09 KG/M2 | DIASTOLIC BLOOD PRESSURE: 73 MMHG

## 2021-02-12 DIAGNOSIS — K86.1 CHRONIC PANCREATITIS, UNSPECIFIED PANCREATITIS TYPE (HCC): Primary | ICD-10-CM

## 2021-02-12 LAB
ALBUMIN SERPL-MCNC: 3.4 G/DL (ref 3.5–5.2)
ALBUMIN/GLOB SERPL: 1.1 G/DL
ALP SERPL-CCNC: 104 U/L (ref 39–117)
ALT SERPL W P-5'-P-CCNC: 16 U/L (ref 1–41)
ANION GAP SERPL CALCULATED.3IONS-SCNC: 9.1 MMOL/L (ref 5–15)
AST SERPL-CCNC: 18 U/L (ref 1–40)
BASOPHILS # BLD AUTO: 0.03 10*3/MM3 (ref 0–0.2)
BASOPHILS NFR BLD AUTO: 0.3 % (ref 0–1.5)
BILIRUB SERPL-MCNC: 0.6 MG/DL (ref 0–1.2)
BILIRUB UR QL STRIP: NEGATIVE
BUN SERPL-MCNC: 7 MG/DL (ref 8–23)
BUN/CREAT SERPL: 10.1 (ref 7–25)
CALCIUM SPEC-SCNC: 9.8 MG/DL (ref 8.6–10.5)
CHLORIDE SERPL-SCNC: 103 MMOL/L (ref 98–107)
CLARITY UR: CLEAR
CO2 SERPL-SCNC: 25.9 MMOL/L (ref 22–29)
COLOR UR: YELLOW
CREAT SERPL-MCNC: 0.69 MG/DL (ref 0.76–1.27)
DEPRECATED RDW RBC AUTO: 52.9 FL (ref 37–54)
EOSINOPHIL # BLD AUTO: 0.42 10*3/MM3 (ref 0–0.4)
EOSINOPHIL NFR BLD AUTO: 4.8 % (ref 0.3–6.2)
ERYTHROCYTE [DISTWIDTH] IN BLOOD BY AUTOMATED COUNT: 14.8 % (ref 12.3–15.4)
GFR SERPL CREATININE-BSD FRML MDRD: 116 ML/MIN/1.73
GLOBULIN UR ELPH-MCNC: 3.2 GM/DL
GLUCOSE SERPL-MCNC: 82 MG/DL (ref 65–99)
GLUCOSE UR STRIP-MCNC: NEGATIVE MG/DL
HCT VFR BLD AUTO: 33.6 % (ref 37.5–51)
HGB BLD-MCNC: 10.8 G/DL (ref 13–17.7)
HGB UR QL STRIP.AUTO: NEGATIVE
HOLD SPECIMEN: NORMAL
HOLD SPECIMEN: NORMAL
IMM GRANULOCYTES # BLD AUTO: 0.03 10*3/MM3 (ref 0–0.05)
IMM GRANULOCYTES NFR BLD AUTO: 0.3 % (ref 0–0.5)
KETONES UR QL STRIP: NEGATIVE
LEUKOCYTE ESTERASE UR QL STRIP.AUTO: NEGATIVE
LIPASE SERPL-CCNC: 169 U/L (ref 13–60)
LYMPHOCYTES # BLD AUTO: 1.69 10*3/MM3 (ref 0.7–3.1)
LYMPHOCYTES NFR BLD AUTO: 19.4 % (ref 19.6–45.3)
MCH RBC QN AUTO: 31 PG (ref 26.6–33)
MCHC RBC AUTO-ENTMCNC: 32.1 G/DL (ref 31.5–35.7)
MCV RBC AUTO: 96.6 FL (ref 79–97)
MONOCYTES # BLD AUTO: 0.88 10*3/MM3 (ref 0.1–0.9)
MONOCYTES NFR BLD AUTO: 10.1 % (ref 5–12)
NEUTROPHILS NFR BLD AUTO: 5.67 10*3/MM3 (ref 1.7–7)
NEUTROPHILS NFR BLD AUTO: 65.1 % (ref 42.7–76)
NITRITE UR QL STRIP: NEGATIVE
NRBC BLD AUTO-RTO: 0 /100 WBC (ref 0–0.2)
PH UR STRIP.AUTO: 6 [PH] (ref 5–8)
PLATELET # BLD AUTO: 355 10*3/MM3 (ref 140–450)
PMV BLD AUTO: 7.9 FL (ref 6–12)
POTASSIUM SERPL-SCNC: 4.9 MMOL/L (ref 3.5–5.2)
PROT SERPL-MCNC: 6.6 G/DL (ref 6–8.5)
PROT UR QL STRIP: NEGATIVE
RBC # BLD AUTO: 3.48 10*6/MM3 (ref 4.14–5.8)
SODIUM SERPL-SCNC: 138 MMOL/L (ref 136–145)
SP GR UR STRIP: 1.02 (ref 1–1.03)
TROPONIN T SERPL-MCNC: <0.01 NG/ML (ref 0–0.03)
UROBILINOGEN UR QL STRIP: NORMAL
WBC # BLD AUTO: 8.72 10*3/MM3 (ref 3.4–10.8)
WHOLE BLOOD HOLD SPECIMEN: NORMAL
WHOLE BLOOD HOLD SPECIMEN: NORMAL

## 2021-02-12 PROCEDURE — 96374 THER/PROPH/DIAG INJ IV PUSH: CPT

## 2021-02-12 PROCEDURE — 80053 COMPREHEN METABOLIC PANEL: CPT | Performed by: EMERGENCY MEDICINE

## 2021-02-12 PROCEDURE — 74177 CT ABD & PELVIS W/CONTRAST: CPT

## 2021-02-12 PROCEDURE — 83690 ASSAY OF LIPASE: CPT | Performed by: EMERGENCY MEDICINE

## 2021-02-12 PROCEDURE — 85025 COMPLETE CBC W/AUTO DIFF WBC: CPT | Performed by: EMERGENCY MEDICINE

## 2021-02-12 PROCEDURE — 93005 ELECTROCARDIOGRAM TRACING: CPT | Performed by: EMERGENCY MEDICINE

## 2021-02-12 PROCEDURE — 81003 URINALYSIS AUTO W/O SCOPE: CPT | Performed by: EMERGENCY MEDICINE

## 2021-02-12 PROCEDURE — 99284 EMERGENCY DEPT VISIT MOD MDM: CPT

## 2021-02-12 PROCEDURE — 25010000002 IOPAMIDOL 61 % SOLUTION: Performed by: EMERGENCY MEDICINE

## 2021-02-12 PROCEDURE — 71046 X-RAY EXAM CHEST 2 VIEWS: CPT

## 2021-02-12 PROCEDURE — 84484 ASSAY OF TROPONIN QUANT: CPT | Performed by: EMERGENCY MEDICINE

## 2021-02-12 PROCEDURE — 25010000002 MORPHINE PER 10 MG: Performed by: EMERGENCY MEDICINE

## 2021-02-12 RX ORDER — HYDROCODONE BITARTRATE AND ACETAMINOPHEN 7.5; 325 MG/1; MG/1
1 TABLET ORAL EVERY 4 HOURS PRN
Qty: 12 TABLET | Refills: 0 | OUTPATIENT
Start: 2021-02-12 | End: 2021-05-27

## 2021-02-12 RX ORDER — HYDROCODONE BITARTRATE AND ACETAMINOPHEN 5; 325 MG/1; MG/1
1 TABLET ORAL EVERY 6 HOURS PRN
COMMUNITY
End: 2021-06-02

## 2021-02-12 RX ORDER — SODIUM CHLORIDE 0.9 % (FLUSH) 0.9 %
10 SYRINGE (ML) INJECTION AS NEEDED
Status: DISCONTINUED | OUTPATIENT
Start: 2021-02-12 | End: 2021-02-12 | Stop reason: HOSPADM

## 2021-02-12 RX ORDER — PANTOPRAZOLE SODIUM 40 MG/1
40 TABLET, DELAYED RELEASE ORAL DAILY
COMMUNITY

## 2021-02-12 RX ORDER — ONDANSETRON 4 MG/1
4 TABLET, ORALLY DISINTEGRATING ORAL EVERY 6 HOURS PRN
Qty: 12 TABLET | Refills: 0 | Status: SHIPPED | OUTPATIENT
Start: 2021-02-12 | End: 2021-05-27 | Stop reason: SDUPTHER

## 2021-02-12 RX ORDER — MORPHINE SULFATE 4 MG/ML
4 INJECTION, SOLUTION INTRAMUSCULAR; INTRAVENOUS ONCE
Status: COMPLETED | OUTPATIENT
Start: 2021-02-12 | End: 2021-02-12

## 2021-02-12 RX ADMIN — MORPHINE SULFATE 4 MG: 4 INJECTION, SOLUTION INTRAMUSCULAR; INTRAVENOUS at 16:16

## 2021-02-12 RX ADMIN — IOPAMIDOL 100 ML: 612 INJECTION, SOLUTION INTRAVENOUS at 17:10

## 2021-02-12 NOTE — ED PROVIDER NOTES
Subjective   This is a 65 year old male who comes in today complaining of chest pain, left epigastric abdominal and left upper quadrant abdominal pain is been present for the past 2 days.  Patient states was recently sent to [facility] in [place] due to hemorrhagic pancreatitis.  States he has history of alcohol abuse, states has not drank since being discharged.  Reports the pain started 1 day ago and has progressively been worsening.  He describes his chest pain as chest tightness. States that he has been having upper abdominal pain. complains of nausea without vomiting.  He reports a history of pancreatitis with abscess and IR drain last year at  and he started drinking again      History provided by:  Patient   used: No    Chest Pain  Pain location:  L chest  Pain quality: aching, sharp and tightness    Pain radiates to:  Neck  Pain severity:  Moderate  Onset quality:  Gradual  Duration:  2 days  Timing:  Intermittent  Progression:  Worsening  Chronicity:  New  Context: not breathing, not drug use, not eating, not intercourse, not lifting, not at rest and not stress    Relieved by:  Nothing  Worsened by:  Nothing  Ineffective treatments:  None tried  Associated symptoms: abdominal pain    Associated symptoms: no altered mental status, no anorexia, no back pain, no diaphoresis, no dizziness, no fatigue, no headache, no heartburn, no numbness, no orthopnea, no shortness of breath and no vomiting    Risk factors: high cholesterol and hypertension    Risk factors: no birth control, no Charly-Danlos syndrome, no prior DVT/PE and no smoking        Review of Systems   Constitutional: Negative.  Negative for activity change, appetite change, diaphoresis and fatigue.   HENT: Negative.  Negative for dental problem, drooling, ear discharge and facial swelling.    Eyes: Negative.  Negative for photophobia, pain, discharge and itching.   Respiratory: Positive for chest tightness. Negative for  apnea, choking and shortness of breath.    Cardiovascular: Positive for chest pain. Negative for orthopnea.   Gastrointestinal: Positive for abdominal distention and abdominal pain. Negative for anorexia, heartburn and vomiting.   Genitourinary: Negative.  Negative for difficulty urinating, dysuria, enuresis, flank pain, frequency, genital sores and hematuria.   Musculoskeletal: Negative.  Negative for arthralgias, back pain and joint swelling.   Skin: Negative.  Negative for color change, pallor, rash and wound.   Neurological: Negative.  Negative for dizziness, facial asymmetry, light-headedness, numbness and headaches.   Hematological: Negative.  Negative for adenopathy.   Psychiatric/Behavioral: Negative.  Negative for agitation, behavioral problems, confusion, dysphoric mood and hallucinations. The patient is not hyperactive.    All other systems reviewed and are negative.      Past Medical History:   Diagnosis Date   • Bleeding nose    • Hypertension    • Pancreatitis        No Known Allergies    Past Surgical History:   Procedure Laterality Date   • ABDOMINAL SURGERY         History reviewed. No pertinent family history.    Social History     Socioeconomic History   • Marital status: Single     Spouse name: Not on file   • Number of children: Not on file   • Years of education: Not on file   • Highest education level: Not on file   Tobacco Use   • Smoking status: Current Every Day Smoker     Packs/day: 1.00     Types: Cigarettes   • Smokeless tobacco: Never Used   Substance and Sexual Activity   • Alcohol use: Yes     Comment: 6-7 beers daily   • Drug use: No   • Sexual activity: Defer           Objective   Physical Exam  Vitals signs and nursing note reviewed.   Constitutional:       General: He is not in acute distress.     Appearance: He is well-developed and normal weight. He is not ill-appearing or toxic-appearing.   HENT:      Head: Normocephalic and atraumatic.      Mouth/Throat:      Mouth: Mucous  membranes are moist.      Pharynx: Oropharynx is clear. No pharyngeal swelling or oropharyngeal exudate.   Eyes:      General: No scleral icterus.     Extraocular Movements: Extraocular movements intact.      Pupils: Pupils are equal, round, and reactive to light.   Cardiovascular:      Rate and Rhythm: Normal rate and regular rhythm.      Heart sounds: Normal heart sounds. No murmur. No friction rub. No gallop.    Pulmonary:      Effort: Pulmonary effort is normal. No respiratory distress.      Breath sounds: Normal breath sounds. No stridor. No wheezing, rhonchi or rales.   Chest:      Chest wall: No tenderness.   Abdominal:      General: Abdomen is flat. Bowel sounds are normal. There is no distension or abdominal bruit. There are no signs of injury.      Palpations: Abdomen is soft. There is no shifting dullness, fluid wave, hepatomegaly, splenomegaly or mass.      Tenderness: There is abdominal tenderness in the epigastric area. There is guarding and rebound.      Hernia: No hernia is present. There is no hernia in the umbilical area, ventral area, left inguinal area, left femoral area or right inguinal area.   Skin:     General: Skin is warm and dry.      Coloration: Skin is not cyanotic, jaundiced, mottled or pale.      Findings: No erythema or rash.   Neurological:      General: No focal deficit present.      Mental Status: He is alert and oriented to person, place, and time.      Cranial Nerves: No cranial nerve deficit.      Motor: No weakness.   Psychiatric:         Mood and Affect: Mood normal. Mood is not anxious or depressed.         Behavior: Behavior normal.         Procedures           ED Course  ED Course as of Feb 12 1954 Fri Feb 12, 2021   1626 EKG interpreted by me reveals sinus rhythm rate of 86.  No ectopy no ischemic changes.    [PF]   6213 IMPRESSION:  No acute cardiopulmonary process.    [BH]      ED Course User Index  [BH] Inder Puente PA-C  [PF] Mega Hernandes DO                                            MDM    Final diagnoses:   Chronic pancreatitis, unspecified pancreatitis type (CMS/HCC)            Inder Puente PA-C  02/12/21 1954

## 2021-04-28 RX ORDER — MELOXICAM 7.5 MG/1
7.5 TABLET ORAL DAILY
COMMUNITY
End: 2021-06-02

## 2021-04-28 RX ORDER — FLUOXETINE HYDROCHLORIDE 20 MG/1
20 CAPSULE ORAL DAILY
COMMUNITY
End: 2021-06-02

## 2021-04-28 RX ORDER — IBUPROFEN 800 MG/1
800 TABLET ORAL AS NEEDED
COMMUNITY
End: 2021-06-02 | Stop reason: ALTCHOICE

## 2021-04-28 RX ORDER — ALBUTEROL SULFATE 90 UG/1
2 AEROSOL, METERED RESPIRATORY (INHALATION) EVERY 4 HOURS PRN
COMMUNITY

## 2021-04-28 RX ORDER — DULOXETIN HYDROCHLORIDE 30 MG/1
30 CAPSULE, DELAYED RELEASE ORAL DAILY
COMMUNITY

## 2021-05-27 ENCOUNTER — APPOINTMENT (OUTPATIENT)
Dept: CT IMAGING | Facility: HOSPITAL | Age: 66
End: 2021-05-27

## 2021-05-27 ENCOUNTER — HOSPITAL ENCOUNTER (EMERGENCY)
Facility: HOSPITAL | Age: 66
Discharge: HOME | End: 2021-05-27
Attending: EMERGENCY MEDICINE | Admitting: EMERGENCY MEDICINE

## 2021-05-27 VITALS
RESPIRATION RATE: 19 BRPM | WEIGHT: 105.4 LBS | HEIGHT: 62 IN | BODY MASS INDEX: 19.4 KG/M2 | SYSTOLIC BLOOD PRESSURE: 147 MMHG | OXYGEN SATURATION: 98 % | TEMPERATURE: 97.5 F | HEART RATE: 79 BPM | DIASTOLIC BLOOD PRESSURE: 95 MMHG

## 2021-05-27 DIAGNOSIS — K86.0 ALCOHOL-INDUCED CHRONIC PANCREATITIS (HCC): Primary | ICD-10-CM

## 2021-05-27 DIAGNOSIS — D73.5 SPLENIC INFARCT: ICD-10-CM

## 2021-05-27 LAB
ALBUMIN SERPL-MCNC: 3.7 G/DL (ref 3.5–5.2)
ALBUMIN/GLOB SERPL: 1.1 G/DL
ALP SERPL-CCNC: 111 U/L (ref 39–117)
ALT SERPL W P-5'-P-CCNC: 21 U/L (ref 1–41)
ANION GAP SERPL CALCULATED.3IONS-SCNC: 10.2 MMOL/L (ref 5–15)
AST SERPL-CCNC: 34 U/L (ref 1–40)
BASOPHILS # BLD AUTO: 0.04 10*3/MM3 (ref 0–0.2)
BASOPHILS NFR BLD AUTO: 0.5 % (ref 0–1.5)
BILIRUB SERPL-MCNC: 1.4 MG/DL (ref 0–1.2)
BILIRUB UR QL STRIP: NEGATIVE
BUN SERPL-MCNC: 6 MG/DL (ref 8–23)
BUN/CREAT SERPL: 9.8 (ref 7–25)
CALCIUM SPEC-SCNC: 9.3 MG/DL (ref 8.6–10.5)
CHLORIDE SERPL-SCNC: 101 MMOL/L (ref 98–107)
CLARITY UR: CLEAR
CO2 SERPL-SCNC: 25.8 MMOL/L (ref 22–29)
COLOR UR: YELLOW
CREAT SERPL-MCNC: 0.61 MG/DL (ref 0.76–1.27)
DEPRECATED RDW RBC AUTO: 54.1 FL (ref 37–54)
EOSINOPHIL # BLD AUTO: 0.28 10*3/MM3 (ref 0–0.4)
EOSINOPHIL NFR BLD AUTO: 3.5 % (ref 0.3–6.2)
ERYTHROCYTE [DISTWIDTH] IN BLOOD BY AUTOMATED COUNT: 14.9 % (ref 12.3–15.4)
ETHANOL BLD-MCNC: 16 MG/DL (ref 0–10)
ETHANOL UR QL: 0.02 %
GFR SERPL CREATININE-BSD FRML MDRD: 134 ML/MIN/1.73
GLOBULIN UR ELPH-MCNC: 3.5 GM/DL
GLUCOSE SERPL-MCNC: 151 MG/DL (ref 65–99)
GLUCOSE UR STRIP-MCNC: NEGATIVE MG/DL
HCT VFR BLD AUTO: 39.9 % (ref 37.5–51)
HGB BLD-MCNC: 13.1 G/DL (ref 13–17.7)
HGB UR QL STRIP.AUTO: NEGATIVE
HOLD SPECIMEN: NORMAL
HOLD SPECIMEN: NORMAL
IMM GRANULOCYTES # BLD AUTO: 0.06 10*3/MM3 (ref 0–0.05)
IMM GRANULOCYTES NFR BLD AUTO: 0.8 % (ref 0–0.5)
KETONES UR QL STRIP: NEGATIVE
LEUKOCYTE ESTERASE UR QL STRIP.AUTO: NEGATIVE
LIPASE SERPL-CCNC: 81 U/L (ref 13–60)
LYMPHOCYTES # BLD AUTO: 2.59 10*3/MM3 (ref 0.7–3.1)
LYMPHOCYTES NFR BLD AUTO: 32.5 % (ref 19.6–45.3)
MCH RBC QN AUTO: 32.3 PG (ref 26.6–33)
MCHC RBC AUTO-ENTMCNC: 32.8 G/DL (ref 31.5–35.7)
MCV RBC AUTO: 98.3 FL (ref 79–97)
MONOCYTES # BLD AUTO: 0.83 10*3/MM3 (ref 0.1–0.9)
MONOCYTES NFR BLD AUTO: 10.4 % (ref 5–12)
NEUTROPHILS NFR BLD AUTO: 4.18 10*3/MM3 (ref 1.7–7)
NEUTROPHILS NFR BLD AUTO: 52.3 % (ref 42.7–76)
NITRITE UR QL STRIP: NEGATIVE
NRBC BLD AUTO-RTO: 0 /100 WBC (ref 0–0.2)
PH UR STRIP.AUTO: 6.5 [PH] (ref 5–8)
PLATELET # BLD AUTO: 170 10*3/MM3 (ref 140–450)
PMV BLD AUTO: 8.8 FL (ref 6–12)
POTASSIUM SERPL-SCNC: 4.3 MMOL/L (ref 3.5–5.2)
PROT SERPL-MCNC: 7.2 G/DL (ref 6–8.5)
PROT UR QL STRIP: NEGATIVE
RBC # BLD AUTO: 4.06 10*6/MM3 (ref 4.14–5.8)
SODIUM SERPL-SCNC: 137 MMOL/L (ref 136–145)
SP GR UR STRIP: 1.02 (ref 1–1.03)
UROBILINOGEN UR QL STRIP: ABNORMAL
WBC # BLD AUTO: 7.98 10*3/MM3 (ref 3.4–10.8)
WHOLE BLOOD HOLD SPECIMEN: NORMAL

## 2021-05-27 PROCEDURE — 80053 COMPREHEN METABOLIC PANEL: CPT | Performed by: EMERGENCY MEDICINE

## 2021-05-27 PROCEDURE — 99284 EMERGENCY DEPT VISIT MOD MDM: CPT

## 2021-05-27 PROCEDURE — 25010000002 IOPAMIDOL 61 % SOLUTION: Performed by: EMERGENCY MEDICINE

## 2021-05-27 PROCEDURE — 83690 ASSAY OF LIPASE: CPT | Performed by: EMERGENCY MEDICINE

## 2021-05-27 PROCEDURE — 85025 COMPLETE CBC W/AUTO DIFF WBC: CPT | Performed by: EMERGENCY MEDICINE

## 2021-05-27 PROCEDURE — 74177 CT ABD & PELVIS W/CONTRAST: CPT

## 2021-05-27 PROCEDURE — 96375 TX/PRO/DX INJ NEW DRUG ADDON: CPT

## 2021-05-27 PROCEDURE — 25010000002 ONDANSETRON PER 1 MG: Performed by: EMERGENCY MEDICINE

## 2021-05-27 PROCEDURE — 25010000002 DROPERIDOL PER 5 MG: Performed by: EMERGENCY MEDICINE

## 2021-05-27 PROCEDURE — 96374 THER/PROPH/DIAG INJ IV PUSH: CPT

## 2021-05-27 PROCEDURE — 25010000002 FENTANYL CITRATE (PF) 50 MCG/ML SOLUTION: Performed by: EMERGENCY MEDICINE

## 2021-05-27 PROCEDURE — 82077 ASSAY SPEC XCP UR&BREATH IA: CPT | Performed by: EMERGENCY MEDICINE

## 2021-05-27 PROCEDURE — 81003 URINALYSIS AUTO W/O SCOPE: CPT | Performed by: EMERGENCY MEDICINE

## 2021-05-27 RX ORDER — DROPERIDOL 2.5 MG/ML
1.25 INJECTION, SOLUTION INTRAMUSCULAR; INTRAVENOUS ONCE
Status: COMPLETED | OUTPATIENT
Start: 2021-05-27 | End: 2021-05-27

## 2021-05-27 RX ORDER — ALUMINA, MAGNESIA, AND SIMETHICONE 2400; 2400; 240 MG/30ML; MG/30ML; MG/30ML
15 SUSPENSION ORAL ONCE
Status: COMPLETED | OUTPATIENT
Start: 2021-05-27 | End: 2021-05-27

## 2021-05-27 RX ORDER — LIDOCAINE HYDROCHLORIDE 20 MG/ML
15 SOLUTION OROPHARYNGEAL ONCE
Status: COMPLETED | OUTPATIENT
Start: 2021-05-27 | End: 2021-05-27

## 2021-05-27 RX ORDER — OXYCODONE HYDROCHLORIDE AND ACETAMINOPHEN 5; 325 MG/1; MG/1
1 TABLET ORAL EVERY 6 HOURS PRN
Qty: 10 TABLET | Refills: 0 | Status: SHIPPED | OUTPATIENT
Start: 2021-05-27 | End: 2021-06-02

## 2021-05-27 RX ORDER — ONDANSETRON 2 MG/ML
4 INJECTION INTRAMUSCULAR; INTRAVENOUS ONCE
Status: COMPLETED | OUTPATIENT
Start: 2021-05-27 | End: 2021-05-27

## 2021-05-27 RX ORDER — OXYCODONE HYDROCHLORIDE AND ACETAMINOPHEN 5; 325 MG/1; MG/1
1 TABLET ORAL ONCE
Status: COMPLETED | OUTPATIENT
Start: 2021-05-27 | End: 2021-05-27

## 2021-05-27 RX ORDER — ONDANSETRON 4 MG/1
4 TABLET, ORALLY DISINTEGRATING ORAL EVERY 6 HOURS PRN
Qty: 12 TABLET | Refills: 0 | Status: SHIPPED | OUTPATIENT
Start: 2021-05-27 | End: 2021-06-02 | Stop reason: SDUPTHER

## 2021-05-27 RX ORDER — SODIUM CHLORIDE 0.9 % (FLUSH) 0.9 %
10 SYRINGE (ML) INJECTION AS NEEDED
Status: DISCONTINUED | OUTPATIENT
Start: 2021-05-27 | End: 2021-05-27 | Stop reason: HOSPADM

## 2021-05-27 RX ORDER — KETOROLAC TROMETHAMINE 30 MG/ML
30 INJECTION, SOLUTION INTRAMUSCULAR; INTRAVENOUS EVERY 6 HOURS PRN
Status: DISCONTINUED | OUTPATIENT
Start: 2021-05-27 | End: 2021-05-27

## 2021-05-27 RX ORDER — FENTANYL CITRATE 50 UG/ML
75 INJECTION, SOLUTION INTRAMUSCULAR; INTRAVENOUS ONCE
Status: COMPLETED | OUTPATIENT
Start: 2021-05-27 | End: 2021-05-27

## 2021-05-27 RX ADMIN — OXYCODONE HYDROCHLORIDE AND ACETAMINOPHEN 1 TABLET: 5; 325 TABLET ORAL at 04:48

## 2021-05-27 RX ADMIN — ONDANSETRON 4 MG: 2 INJECTION INTRAMUSCULAR; INTRAVENOUS at 01:58

## 2021-05-27 RX ADMIN — SODIUM CHLORIDE, POTASSIUM CHLORIDE, SODIUM LACTATE AND CALCIUM CHLORIDE 1000 ML: 600; 310; 30; 20 INJECTION, SOLUTION INTRAVENOUS at 02:01

## 2021-05-27 RX ADMIN — DROPERIDOL 1.25 MG: 2.5 INJECTION, SOLUTION INTRAMUSCULAR; INTRAVENOUS at 03:47

## 2021-05-27 RX ADMIN — FENTANYL CITRATE 75 MCG: 50 INJECTION, SOLUTION INTRAMUSCULAR; INTRAVENOUS at 01:59

## 2021-05-27 RX ADMIN — ALUMINUM HYDROXIDE, MAGNESIUM HYDROXIDE, AND DIMETHICONE 15 ML: 400; 400; 40 SUSPENSION ORAL at 03:47

## 2021-05-27 RX ADMIN — IOPAMIDOL 100 ML: 612 INJECTION, SOLUTION INTRAVENOUS at 02:30

## 2021-05-27 RX ADMIN — LIDOCAINE HYDROCHLORIDE 15 ML: 20 SOLUTION ORAL; TOPICAL at 03:47

## 2021-06-02 ENCOUNTER — OFFICE VISIT (OUTPATIENT)
Dept: GASTROENTEROLOGY | Facility: CLINIC | Age: 66
End: 2021-06-02

## 2021-06-02 ENCOUNTER — TELEPHONE (OUTPATIENT)
Dept: GASTROENTEROLOGY | Facility: CLINIC | Age: 66
End: 2021-06-02

## 2021-06-02 VITALS
BODY MASS INDEX: 17.19 KG/M2 | DIASTOLIC BLOOD PRESSURE: 77 MMHG | HEIGHT: 66 IN | SYSTOLIC BLOOD PRESSURE: 102 MMHG | TEMPERATURE: 99.1 F | WEIGHT: 107 LBS | HEART RATE: 83 BPM

## 2021-06-02 DIAGNOSIS — K86.0 ALCOHOL-INDUCED CHRONIC PANCREATITIS (HCC): ICD-10-CM

## 2021-06-02 DIAGNOSIS — K86.3 PANCREATIC PSEUDOCYST: ICD-10-CM

## 2021-06-02 DIAGNOSIS — R11.0 NAUSEA: ICD-10-CM

## 2021-06-02 DIAGNOSIS — K44.9 HIATAL HERNIA: ICD-10-CM

## 2021-06-02 DIAGNOSIS — K70.0 ALCOHOLIC FATTY LIVER: ICD-10-CM

## 2021-06-02 DIAGNOSIS — K86.9 LESION OF PANCREAS: Primary | ICD-10-CM

## 2021-06-02 DIAGNOSIS — K86.0 ALCOHOL-INDUCED CHRONIC PANCREATITIS (HCC): Primary | ICD-10-CM

## 2021-06-02 DIAGNOSIS — R93.3 ABNORMAL CT SCAN, ESOPHAGUS: ICD-10-CM

## 2021-06-02 PROCEDURE — 99204 OFFICE O/P NEW MOD 45 MIN: CPT | Performed by: PHYSICIAN ASSISTANT

## 2021-06-02 RX ORDER — PANCRELIPASE 36000; 180000; 114000 [USP'U]/1; [USP'U]/1; [USP'U]/1
CAPSULE, DELAYED RELEASE PELLETS ORAL
Qty: 240 CAPSULE | Refills: 3 | Status: SHIPPED | OUTPATIENT
Start: 2021-06-02

## 2021-06-02 RX ORDER — SODIUM CHLORIDE 9 MG/ML
30 INJECTION, SOLUTION INTRAVENOUS CONTINUOUS PRN
Status: CANCELLED | OUTPATIENT
Start: 2021-06-02

## 2021-06-02 RX ORDER — PANCRELIPASE 36000; 180000; 114000 [USP'U]/1; [USP'U]/1; [USP'U]/1
CAPSULE, DELAYED RELEASE PELLETS ORAL
Qty: 240 CAPSULE | Refills: 3 | Status: SHIPPED | OUTPATIENT
Start: 2021-06-02 | End: 2021-06-02 | Stop reason: SDUPTHER

## 2021-06-02 RX ORDER — ONDANSETRON 4 MG/1
4 TABLET, ORALLY DISINTEGRATING ORAL EVERY 6 HOURS PRN
Qty: 42 TABLET | Refills: 2 | Status: SHIPPED | OUTPATIENT
Start: 2021-06-02 | End: 2021-06-02 | Stop reason: SDUPTHER

## 2021-06-02 RX ORDER — ONDANSETRON 4 MG/1
4 TABLET, ORALLY DISINTEGRATING ORAL EVERY 6 HOURS PRN
Qty: 42 TABLET | Refills: 2 | Status: SHIPPED | OUTPATIENT
Start: 2021-06-02

## 2021-06-02 NOTE — PATIENT INSTRUCTIONS
Pancreatitis Eating Plan  Pancreatitis is when your pancreas becomes irritated and swollen (inflamed). The pancreas is a small organ located behind your stomach. It helps your body digest food and regulate your blood sugar. Pancreatitis can affect how your body digests food, especially foods with fat. You may also have other symptoms such as abdominal pain or nausea.  When you have pancreatitis, following a low-fat eating plan may help you manage symptoms and recover more quickly. Work with your health care provider or a diet and nutrition specialist (dietitian) to create an eating plan that is right for you.  What are tips for following this plan?  Reading food labels  Use the information on food labels to help keep track of how much fat you eat:  · Check the serving size.  · Look for the amount of total fat in grams (g) in one serving.  ? Low-fat foods have 3 g of fat or less per serving.  ? Fat-free foods have 0.5 g of fat or less per serving.  · Keep track of how much fat you eat based on how many servings you eat.  ? For example, if you eat two servings, the amount of fat you eat will be two times what is listed on the label.  Shopping    · Buy low-fat or nonfat foods, such as:  ? Fresh, frozen, or canned fruits and vegetables.  ? Grains, including pasta, bread, and rice.  ? Lean meat, poultry, fish, and other protein foods.  ? Low-fat or nonfat dairy.  · Avoid buying bakery products and other sweets made with whole milk, butter, and eggs.  · Avoid buying snack foods with added fat, such as anything with butter or cheese flavoring.  Cooking  · Remove skin from poultry, and remove extra fat from meat.  · Limit the amount of fat and oil you use to 6 teaspoons or less per day.  · Cook using low-fat methods, such as boiling, broiling, grilling, steaming, or baking.  · Use spray oil to cook. Add fat-free chicken broth to add flavor and moisture.  · Avoid adding cream to thicken soups or sauces. Use other thickeners  such as corn starch or tomato paste.  Meal planning    · Eat a low-fat diet as told by your dietitian. For most people, this means having no more than 55-65 grams of fat each day.  · Eat small, frequent meals throughout the day. For example, you may have 5-6 small meals instead of 3 large meals.  · Drink enough fluid to keep your urine pale yellow.  · Do not drink alcohol. Talk to your health care provider if you need help stopping.  · Limit how much caffeine you have, including black coffee, black and green tea, caffeinated soft drinks, and energy drinks.  General information  · Let your health care provider or dietitian know if you have unplanned weight loss on this eating plan.  · You may be instructed to follow a clear liquid diet during a flare of symptoms. Talk with your health care provider about how to manage your diet during symptoms of a flare.  · Take any vitamins or supplements as told by your health care provider.  · Work with a dietitian, especially if you have other conditions such as obesity or diabetes mellitus.  What foods should I avoid?  Fruits  Fried fruits. Fruits served with butter or cream.  Vegetables  Fried vegetables. Vegetables cooked with butter, cheese, or cream.  Grains  Biscuits, waffles, donuts, pastries, and croissants. Pies and cookies. Butter-flavored popcorn. Regular crackers.  Meats and other protein foods  Fatty cuts of meat. Poultry with skin. Organ meats. Leonard, sausage, and cold cuts. Whole eggs. Nuts and nut butters.  Dairy  Whole and 2% milk. Whole milk yogurt. Whole milk ice cream. Cream and half-and-half. Cream cheese. Sour cream. Cheese.  Beverages  Wine, beer, and liquor.  The items listed above may not be a complete list of foods and beverages to avoid. Contact a dietitian for more information.  Summary  · Pancreatitis can affect how your body digests food, especially foods with fat.  · When you have pancreatitis, it is recommended that you follow a low-fat eating  plan to help you recover more quickly and manage symptoms. For most people, this means limiting fat to no more than 55-65 grams per day.  · Do not drink alcohol. Limit the amount of caffeine you have, and drink enough fluid to keep your urine pale yellow.  This information is not intended to replace advice given to you by your health care provider. Make sure you discuss any questions you have with your health care provider.  Document Revised: 04/09/2020 Document Reviewed: 03/26/2019  [employer]vier Patient Education © 2021 [employer]
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
## 2021-06-02 NOTE — TELEPHONE ENCOUNTER
Can you let this pt know I have added EGD and this needs to be arranged. Also, please change f/u to seeing Dr. Ivey next visit. Thanks.

## 2021-06-07 NOTE — PROGRESS NOTES
For this patient encounter, I reviewed and concur with the PA documentation, treatment plan, and medical decision making.      Patient's history is very suggestive chronic alcoholic liver disease  and recurrent alcohol induced pancreatitis with recent pancreatic pseudocyst formation. Nature of the Lesion in the tail of the pancrease is not very clear, this needs to be further clarified by the MRI scan with  and without contrast.    Imaging studies are also suggestive of chronic pancreatitis.  No convincing evidence of cirrhosis.   His CT findings more suggestive of erosive esophagitis  Dilated small bowel loops likely with ileus associated with pancreatitis     Recommend absolute abstinence from Alcohol  Low fat diet  Will get fecal elastase and confirm any evidence of EPI before starting on creon therapy (test to be done while discontinuing the medicine for 24-48 hours if already started)  Low dose diuretics- Aldactone 50mg po daily. Will add lasix 20 after reassessing next visit  NICHELLE Ivey MD  6/6/2021  20:36 EDT

## 2021-06-10 RX ORDER — SPIRONOLACTONE 50 MG/1
50 TABLET, FILM COATED ORAL DAILY
Qty: 30 TABLET | Refills: 2 | Status: SHIPPED | OUTPATIENT
Start: 2021-06-10

## 2021-06-10 NOTE — TELEPHONE ENCOUNTER
Also please ask pt to stop Creon if already started for 2 days then complete stool test I ordered.     Starting a diuretic for him- aldactone 50 mg once daily.

## 2021-06-23 NOTE — TELEPHONE ENCOUNTER
Called mobile number, call could not be completed.  Called home number, rings and rings without answer.

## 2021-06-30 ENCOUNTER — HOSPITAL ENCOUNTER (EMERGENCY)
Facility: HOSPITAL | Age: 66
Discharge: LEFT BEFORE DISCHARGE | End: 2021-06-30

## 2021-06-30 VITALS
DIASTOLIC BLOOD PRESSURE: 81 MMHG | BODY MASS INDEX: 16.88 KG/M2 | RESPIRATION RATE: 24 BRPM | OXYGEN SATURATION: 96 % | WEIGHT: 105 LBS | SYSTOLIC BLOOD PRESSURE: 128 MMHG | HEIGHT: 66 IN | HEART RATE: 97 BPM | TEMPERATURE: 97.4 F

## 2021-06-30 PROCEDURE — 99211 OFF/OP EST MAY X REQ PHY/QHP: CPT

## 2021-08-17 ENCOUNTER — HOSPITAL ENCOUNTER (EMERGENCY)
Facility: HOSPITAL | Age: 66
Discharge: HOME | End: 2021-08-17
Attending: EMERGENCY MEDICINE | Admitting: EMERGENCY MEDICINE

## 2021-08-17 VITALS
HEART RATE: 85 BPM | BODY MASS INDEX: 20.09 KG/M2 | DIASTOLIC BLOOD PRESSURE: 79 MMHG | TEMPERATURE: 97.4 F | HEIGHT: 66 IN | RESPIRATION RATE: 20 BRPM | OXYGEN SATURATION: 100 % | WEIGHT: 125 LBS | SYSTOLIC BLOOD PRESSURE: 114 MMHG

## 2021-08-17 DIAGNOSIS — R21 RASH: Primary | ICD-10-CM

## 2021-08-17 LAB
ALBUMIN SERPL-MCNC: 2.6 G/DL (ref 3.5–5.2)
ALBUMIN/GLOB SERPL: 0.7 G/DL
ALP SERPL-CCNC: 88 U/L (ref 39–117)
ALT SERPL W P-5'-P-CCNC: 25 U/L (ref 1–41)
ANION GAP SERPL CALCULATED.3IONS-SCNC: 13 MMOL/L (ref 5–15)
APTT PPP: 41.7 SECONDS (ref 24.5–37.2)
AST SERPL-CCNC: 51 U/L (ref 1–40)
BASOPHILS # BLD AUTO: 0.02 10*3/MM3 (ref 0–0.2)
BASOPHILS NFR BLD AUTO: 0.3 % (ref 0–1.5)
BILIRUB SERPL-MCNC: 1.9 MG/DL (ref 0–1.2)
BUN SERPL-MCNC: 7 MG/DL (ref 8–23)
BUN/CREAT SERPL: 15.2 (ref 7–25)
CALCIUM SPEC-SCNC: 8.4 MG/DL (ref 8.6–10.5)
CHLORIDE SERPL-SCNC: 96 MMOL/L (ref 98–107)
CO2 SERPL-SCNC: 18 MMOL/L (ref 22–29)
CREAT SERPL-MCNC: 0.46 MG/DL (ref 0.76–1.27)
CRP SERPL-MCNC: 8.39 MG/DL (ref 0–0.5)
DEPRECATED RDW RBC AUTO: 47 FL (ref 37–54)
EOSINOPHIL # BLD AUTO: 0.03 10*3/MM3 (ref 0–0.4)
EOSINOPHIL NFR BLD AUTO: 0.4 % (ref 0.3–6.2)
ERYTHROCYTE [DISTWIDTH] IN BLOOD BY AUTOMATED COUNT: 13.3 % (ref 12.3–15.4)
ERYTHROCYTE [SEDIMENTATION RATE] IN BLOOD: 34 MM/HR (ref 0–15)
GFR SERPL CREATININE-BSD FRML MDRD: >150 ML/MIN/1.73
GLOBULIN UR ELPH-MCNC: 3.6 GM/DL
GLUCOSE SERPL-MCNC: 104 MG/DL (ref 65–99)
HCT VFR BLD AUTO: 32.2 % (ref 37.5–51)
HGB BLD-MCNC: 10.9 G/DL (ref 13–17.7)
IMM GRANULOCYTES # BLD AUTO: 0.06 10*3/MM3 (ref 0–0.05)
IMM GRANULOCYTES NFR BLD AUTO: 0.8 % (ref 0–0.5)
INR PPP: 1.17 (ref 0.9–1.1)
LYMPHOCYTES # BLD AUTO: 0.87 10*3/MM3 (ref 0.7–3.1)
LYMPHOCYTES NFR BLD AUTO: 12.2 % (ref 19.6–45.3)
MCH RBC QN AUTO: 32.4 PG (ref 26.6–33)
MCHC RBC AUTO-ENTMCNC: 33.9 G/DL (ref 31.5–35.7)
MCV RBC AUTO: 95.8 FL (ref 79–97)
MONOCYTES # BLD AUTO: 0.48 10*3/MM3 (ref 0.1–0.9)
MONOCYTES NFR BLD AUTO: 6.7 % (ref 5–12)
NEUTROPHILS NFR BLD AUTO: 5.7 10*3/MM3 (ref 1.7–7)
NEUTROPHILS NFR BLD AUTO: 79.6 % (ref 42.7–76)
NRBC BLD AUTO-RTO: 0 /100 WBC (ref 0–0.2)
PLATELET # BLD AUTO: 242 10*3/MM3 (ref 140–450)
PMV BLD AUTO: 8.9 FL (ref 6–12)
POTASSIUM SERPL-SCNC: 3.6 MMOL/L (ref 3.5–5.2)
PROT SERPL-MCNC: 6.2 G/DL (ref 6–8.5)
PROTHROMBIN TIME: 15.5 SECONDS (ref 12–15.1)
RBC # BLD AUTO: 3.36 10*6/MM3 (ref 4.14–5.8)
SODIUM SERPL-SCNC: 127 MMOL/L (ref 136–145)
WBC # BLD AUTO: 7.16 10*3/MM3 (ref 3.4–10.8)

## 2021-08-17 PROCEDURE — 85730 THROMBOPLASTIN TIME PARTIAL: CPT | Performed by: EMERGENCY MEDICINE

## 2021-08-17 PROCEDURE — 96374 THER/PROPH/DIAG INJ IV PUSH: CPT

## 2021-08-17 PROCEDURE — 99283 EMERGENCY DEPT VISIT LOW MDM: CPT

## 2021-08-17 PROCEDURE — 25010000002 FENTANYL CITRATE (PF) 50 MCG/ML SOLUTION: Performed by: EMERGENCY MEDICINE

## 2021-08-17 PROCEDURE — 80053 COMPREHEN METABOLIC PANEL: CPT | Performed by: EMERGENCY MEDICINE

## 2021-08-17 PROCEDURE — 86140 C-REACTIVE PROTEIN: CPT | Performed by: EMERGENCY MEDICINE

## 2021-08-17 PROCEDURE — 85651 RBC SED RATE NONAUTOMATED: CPT | Performed by: EMERGENCY MEDICINE

## 2021-08-17 PROCEDURE — 85025 COMPLETE CBC W/AUTO DIFF WBC: CPT | Performed by: EMERGENCY MEDICINE

## 2021-08-17 PROCEDURE — 85610 PROTHROMBIN TIME: CPT | Performed by: EMERGENCY MEDICINE

## 2021-08-17 RX ORDER — PREDNISONE 10 MG/1
TABLET ORAL
Qty: 48 TABLET | Refills: 0 | Status: SHIPPED | OUTPATIENT
Start: 2021-08-17

## 2021-08-17 RX ORDER — HYDROCODONE BITARTRATE AND ACETAMINOPHEN 5; 325 MG/1; MG/1
1 TABLET ORAL EVERY 6 HOURS PRN
Qty: 10 TABLET | Refills: 0 | Status: SHIPPED | OUTPATIENT
Start: 2021-08-17

## 2021-08-17 RX ORDER — FENTANYL CITRATE 50 UG/ML
50 INJECTION, SOLUTION INTRAMUSCULAR; INTRAVENOUS ONCE
Status: COMPLETED | OUTPATIENT
Start: 2021-08-17 | End: 2021-08-17

## 2021-08-17 RX ADMIN — SODIUM CHLORIDE 1000 ML: 9 INJECTION, SOLUTION INTRAVENOUS at 12:53

## 2021-08-17 RX ADMIN — FENTANYL CITRATE 50 MCG: 50 INJECTION, SOLUTION INTRAMUSCULAR; INTRAVENOUS at 11:13
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
## 2021-09-08 ENCOUNTER — HOSPITAL ENCOUNTER (EMERGENCY)
Facility: HOSPITAL | Age: 66
Discharge: HOME | End: 2021-09-08
Attending: EMERGENCY MEDICINE | Admitting: EMERGENCY MEDICINE

## 2021-09-08 VITALS
HEART RATE: 76 BPM | OXYGEN SATURATION: 99 % | DIASTOLIC BLOOD PRESSURE: 74 MMHG | RESPIRATION RATE: 18 BRPM | BODY MASS INDEX: 16.46 KG/M2 | WEIGHT: 102.4 LBS | TEMPERATURE: 97.9 F | SYSTOLIC BLOOD PRESSURE: 116 MMHG | HEIGHT: 66 IN

## 2021-09-08 DIAGNOSIS — K64.9 HEMORRHOIDS, UNSPECIFIED HEMORRHOID TYPE: Primary | ICD-10-CM

## 2021-09-08 PROCEDURE — 99282 EMERGENCY DEPT VISIT SF MDM: CPT

## 2021-09-08 RX ORDER — HYDROCORTISONE ACETATE 25 MG/1
25 SUPPOSITORY RECTAL 2 TIMES DAILY PRN
Qty: 14 SUPPOSITORY | Refills: 0 | Status: SHIPPED | OUTPATIENT
Start: 2021-09-08 | End: 2021-09-15
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
